# Patient Record
Sex: MALE | Race: WHITE | Employment: FULL TIME | URBAN - METROPOLITAN AREA
[De-identification: names, ages, dates, MRNs, and addresses within clinical notes are randomized per-mention and may not be internally consistent; named-entity substitution may affect disease eponyms.]

---

## 2017-04-20 ENCOUNTER — OFFICE VISIT (OUTPATIENT)
Dept: FAMILY MEDICINE CLINIC | Age: 37
End: 2017-04-20
Payer: COMMERCIAL

## 2017-04-20 VITALS
WEIGHT: 168 LBS | SYSTOLIC BLOOD PRESSURE: 120 MMHG | HEART RATE: 64 BPM | OXYGEN SATURATION: 98 % | BODY MASS INDEX: 22.26 KG/M2 | HEIGHT: 73 IN | DIASTOLIC BLOOD PRESSURE: 78 MMHG | TEMPERATURE: 98.7 F

## 2017-04-20 DIAGNOSIS — H01.001 BLEPHARITIS OF RIGHT UPPER EYELID, UNSPECIFIED TYPE: Primary | ICD-10-CM

## 2017-04-20 DIAGNOSIS — Z23 NEED FOR PNEUMOCOCCAL VACCINATION: ICD-10-CM

## 2017-04-20 PROCEDURE — 90732 PPSV23 VACC 2 YRS+ SUBQ/IM: CPT | Performed by: NURSE PRACTITIONER

## 2017-04-20 PROCEDURE — 90471 IMMUNIZATION ADMIN: CPT | Performed by: NURSE PRACTITIONER

## 2017-04-20 PROCEDURE — 99213 OFFICE O/P EST LOW 20 MIN: CPT | Performed by: NURSE PRACTITIONER

## 2017-04-20 RX ORDER — ERYTHROMYCIN 5 MG/G
OINTMENT OPHTHALMIC
Qty: 1 G | Refills: 0 | Status: SHIPPED | OUTPATIENT
Start: 2017-04-20 | End: 2017-04-30

## 2017-04-20 ASSESSMENT — ENCOUNTER SYMPTOMS
EYE REDNESS: 1
BLURRED VISION: 1
DOUBLE VISION: 0
PHOTOPHOBIA: 0
FOREIGN BODY SENSATION: 1
EYE DISCHARGE: 1
VOMITING: 0
NAUSEA: 0

## 2018-03-27 ENCOUNTER — OFFICE VISIT (OUTPATIENT)
Dept: FAMILY MEDICINE CLINIC | Age: 38
End: 2018-03-27
Payer: COMMERCIAL

## 2018-03-27 VITALS
HEART RATE: 66 BPM | BODY MASS INDEX: 21.34 KG/M2 | DIASTOLIC BLOOD PRESSURE: 70 MMHG | SYSTOLIC BLOOD PRESSURE: 124 MMHG | TEMPERATURE: 98.1 F | WEIGHT: 161 LBS | HEIGHT: 73 IN | OXYGEN SATURATION: 98 %

## 2018-03-27 DIAGNOSIS — Z71.6 ENCOUNTER FOR SMOKING CESSATION COUNSELING: Primary | ICD-10-CM

## 2018-03-27 PROCEDURE — 99407 BEHAV CHNG SMOKING > 10 MIN: CPT | Performed by: NURSE PRACTITIONER

## 2018-03-27 RX ORDER — VARENICLINE TARTRATE 25 MG
KIT ORAL
Qty: 53 TABLET | Refills: 0 | Status: SHIPPED | OUTPATIENT
Start: 2018-03-27 | End: 2018-04-23 | Stop reason: SDUPTHER

## 2018-03-27 ASSESSMENT — ENCOUNTER SYMPTOMS
COUGH: 0
SHORTNESS OF BREATH: 0
VOMITING: 0
NAUSEA: 0
DIARRHEA: 0

## 2018-03-27 ASSESSMENT — PATIENT HEALTH QUESTIONNAIRE - PHQ9
SUM OF ALL RESPONSES TO PHQ QUESTIONS 1-9: 0
1. LITTLE INTEREST OR PLEASURE IN DOING THINGS: 0
SUM OF ALL RESPONSES TO PHQ9 QUESTIONS 1 & 2: 0
2. FEELING DOWN, DEPRESSED OR HOPELESS: 0

## 2018-03-27 NOTE — PROGRESS NOTES
does not drink alcohol. Drug Use:  reports that he does not use drugs. Family History:     No family history on file. Review of Systems:         Review of Systems   Constitutional: Negative for chills and fever. Respiratory: Negative for cough and shortness of breath. Cardiovascular: Negative for chest pain and palpitations. Gastrointestinal: Negative for diarrhea, nausea and vomiting. Physical Exam:     Vitals:  /70   Pulse 66   Temp 98.1 °F (36.7 °C) (Oral)   Ht 6' 1\" (1.854 m)   Wt 161 lb (73 kg)   SpO2 98%   BMI 21.24 kg/m²       Physical Exam   Constitutional: Vital signs are normal. He appears well-developed and well-nourished. He is cooperative. He does not appear ill. No distress. Cardiovascular: Normal rate, regular rhythm, S1 normal and S2 normal.    Pulmonary/Chest: Effort normal. No respiratory distress. He has decreased breath sounds in the right lower field and the left lower field. Skin: Skin is warm, dry and intact. Nursing note and vitals reviewed. Data:     No results found for: NA, K, CL, CO2, BUN, CREATININE, GLUCOSE, PROT, LABALBU, BILITOT, ALKPHOS, AST, ALT  No results found for: WBC, RBC, HGB, HCT, MCV, MCH, MCHC, RDW, PLT, MPV  No results found for: TSH  No results found for: CHOL, HDL, PSA, LABA1C       Assessment & Plan       1. Encounter for smoking cessation counseling       --Patient is wanting to stop smoking. Patient extensively counseled about use of Chantix. Patient educated that if he has any suicidal thoughts or ideation he is to immediately stop taking Chantix and call the office. Disease process of nicotine addiction explained to patient. Patient counseled that he needs to change his daily habits in order to decrease the risk of restarting smoking. Patient educated that every day that he does not smoke is a chance for his lungs to began to heal    Patient verbalizes understanding and agreement with plan.   All questions

## 2018-03-27 NOTE — PATIENT INSTRUCTIONS
SURVEY:    You may be receiving a survey from [employer] regarding your visit today. Please complete the survey to enable us to provide the highest quality of care to you and your family. If you cannot score us a very good on any question, please call the office to discuss how we could have made your experience a very good one. Thank you.

## 2018-03-29 ENCOUNTER — TELEPHONE (OUTPATIENT)
Dept: FAMILY MEDICINE CLINIC | Age: 38
End: 2018-03-29

## 2018-04-23 RX ORDER — VARENICLINE TARTRATE 25 MG
KIT ORAL
Qty: 53 TABLET | Refills: 0 | Status: SHIPPED | OUTPATIENT
Start: 2018-04-23 | End: 2018-05-21 | Stop reason: ALTCHOICE

## 2018-05-21 ENCOUNTER — OFFICE VISIT (OUTPATIENT)
Dept: FAMILY MEDICINE CLINIC | Age: 38
End: 2018-05-21
Payer: COMMERCIAL

## 2018-05-21 VITALS
HEART RATE: 54 BPM | DIASTOLIC BLOOD PRESSURE: 78 MMHG | BODY MASS INDEX: 21.4 KG/M2 | WEIGHT: 158 LBS | HEIGHT: 72 IN | SYSTOLIC BLOOD PRESSURE: 120 MMHG | OXYGEN SATURATION: 99 %

## 2018-05-21 DIAGNOSIS — H00.022 HORDEOLUM INTERNUM OF RIGHT LOWER EYELID: Primary | ICD-10-CM

## 2018-05-21 DIAGNOSIS — Z71.6 ENCOUNTER FOR SMOKING CESSATION COUNSELING: ICD-10-CM

## 2018-05-21 PROCEDURE — 99213 OFFICE O/P EST LOW 20 MIN: CPT | Performed by: NURSE PRACTITIONER

## 2018-05-21 RX ORDER — SULFAMETHOXAZOLE AND TRIMETHOPRIM 800; 160 MG/1; MG/1
1 TABLET ORAL 2 TIMES DAILY
Qty: 14 TABLET | Refills: 0 | Status: SHIPPED | OUTPATIENT
Start: 2018-05-21 | End: 2018-05-28

## 2018-05-21 RX ORDER — VARENICLINE TARTRATE 1 MG/1
1 TABLET, FILM COATED ORAL 2 TIMES DAILY
Qty: 60 TABLET | Refills: 0 | Status: SHIPPED | OUTPATIENT
Start: 2018-05-21 | End: 2018-06-18 | Stop reason: SDUPTHER

## 2018-05-21 ASSESSMENT — ENCOUNTER SYMPTOMS
EYE DISCHARGE: 0
DIARRHEA: 0
VOMITING: 0
DOUBLE VISION: 0
NAUSEA: 0
EYE REDNESS: 1
EYE PAIN: 1
BLURRED VISION: 0
SHORTNESS OF BREATH: 0
COUGH: 0

## 2018-06-18 ENCOUNTER — OFFICE VISIT (OUTPATIENT)
Dept: FAMILY MEDICINE CLINIC | Age: 38
End: 2018-06-18
Payer: COMMERCIAL

## 2018-06-18 VITALS
OXYGEN SATURATION: 98 % | SYSTOLIC BLOOD PRESSURE: 102 MMHG | DIASTOLIC BLOOD PRESSURE: 60 MMHG | HEART RATE: 64 BPM | WEIGHT: 161 LBS | BODY MASS INDEX: 21.84 KG/M2 | TEMPERATURE: 98.2 F

## 2018-06-18 DIAGNOSIS — Z71.6 ENCOUNTER FOR SMOKING CESSATION COUNSELING: Primary | ICD-10-CM

## 2018-06-18 PROCEDURE — 99407 BEHAV CHNG SMOKING > 10 MIN: CPT | Performed by: NURSE PRACTITIONER

## 2018-06-18 RX ORDER — VARENICLINE TARTRATE 1 MG/1
1 TABLET, FILM COATED ORAL 2 TIMES DAILY
Qty: 60 TABLET | Refills: 0 | Status: SHIPPED | OUTPATIENT
Start: 2018-06-18 | End: 2018-10-25 | Stop reason: ALTCHOICE

## 2018-06-18 ASSESSMENT — ENCOUNTER SYMPTOMS
COUGH: 0
NAUSEA: 0
DIARRHEA: 0
VOMITING: 0
SHORTNESS OF BREATH: 0

## 2018-10-25 ENCOUNTER — OFFICE VISIT (OUTPATIENT)
Dept: FAMILY MEDICINE CLINIC | Age: 38
End: 2018-10-25
Payer: COMMERCIAL

## 2018-10-25 VITALS
WEIGHT: 170 LBS | DIASTOLIC BLOOD PRESSURE: 80 MMHG | BODY MASS INDEX: 23.06 KG/M2 | OXYGEN SATURATION: 98 % | HEART RATE: 68 BPM | TEMPERATURE: 97.7 F | SYSTOLIC BLOOD PRESSURE: 120 MMHG

## 2018-10-25 DIAGNOSIS — F51.01 PRIMARY INSOMNIA: Primary | ICD-10-CM

## 2018-10-25 PROCEDURE — 99213 OFFICE O/P EST LOW 20 MIN: CPT | Performed by: NURSE PRACTITIONER

## 2018-10-25 RX ORDER — TRAZODONE HYDROCHLORIDE 50 MG/1
50 TABLET ORAL NIGHTLY
Qty: 30 TABLET | Refills: 0 | Status: SHIPPED | OUTPATIENT
Start: 2018-10-25 | End: 2019-03-21 | Stop reason: SDUPTHER

## 2018-10-25 ASSESSMENT — ENCOUNTER SYMPTOMS
VOMITING: 0
DIARRHEA: 0
SHORTNESS OF BREATH: 0
COUGH: 0
NAUSEA: 0

## 2018-10-25 NOTE — PATIENT INSTRUCTIONS
does sleeping well mean? Sleeping well means getting enough sleep. How much sleep is enough varies among people. The number of hours you sleep is not as important as how you feel when you wake up. If you do not feel refreshed, you probably need more sleep. Another sign of not getting enough sleep is feeling tired during the day. The average total nightly sleep time is 7½ to 8 hours. Healthy adults may need a little more or a little less than this. Why is getting enough sleep important? Getting enough quality sleep is a basic part of good health. When your sleep suffers, your mood and your thoughts can suffer too. You may find yourself feeling more grumpy or stressed. Not getting enough sleep also can lead to serious problems, including injury, accidents, anxiety, and depression. What might cause poor sleeping? Many things can cause sleep problems, including:  · Stress. Stress can be caused by fear about a single event, such as giving a speech. Or you may have ongoing stress, such as worry about work or school. · Depression, anxiety, and other mental or emotional conditions. · Changes in your sleep habits or surroundings. This includes changes that happen where you sleep, such as noise, light, or sleeping in a different bed. It also includes changes in your sleep pattern, such as having jet lag or working a late shift. · Health problems, such as pain, breathing problems, and restless legs syndrome. · Lack of regular exercise. How can you help yourself? Here are some tips that may help you sleep more soundly and wake up feeling more refreshed. Your sleeping area  · Use your bedroom only for sleeping and sex. A bit of light reading may help you fall asleep. But if it doesn't, do your reading elsewhere in the house. Don't watch TV in bed. · Be sure your bed is big enough to stretch out comfortably, especially if you have a sleep partner. · Keep your bedroom quiet, dark, and cool.  Use curtains, blinds,

## 2018-10-25 NOTE — PROGRESS NOTES
Cardiovascular: Negative for chest pain and palpitations. Gastrointestinal: Negative for diarrhea, nausea and vomiting. Physical Exam:     Vitals:  /80   Pulse 68   Temp 97.7 °F (36.5 °C) (Oral)   Wt 170 lb (77.1 kg)   SpO2 98%   BMI 23.06 kg/m²       Physical Exam   Constitutional: Vital signs are normal. He appears well-developed and well-nourished. He is cooperative. Cardiovascular: Normal rate, regular rhythm, S1 normal and S2 normal.    Pulmonary/Chest: Effort normal and breath sounds normal. No respiratory distress. Skin: Skin is warm, dry and intact. Nursing note and vitals reviewed. Data:     No results found for: NA, K, CL, CO2, BUN, CREATININE, GLUCOSE, PROT, LABALBU, BILITOT, ALKPHOS, AST, ALT  No results found for: WBC, RBC, HGB, HCT, MCV, MCH, MCHC, RDW, PLT, MPV  No results found for: TSH  No results found for: CHOL, HDL, PSA, LABA1C       Assessment & Plan        Diagnosis Orders   1. Primary insomnia       Patient educated about sleep hygiene and bedtime routine. Will start patient on trazodone 50 mg by mouth nightly. Patient educated about medication. Patient educated to decrease or stop caffeine 4-6 hours prior to bedtime. Patient verbalizes understanding and agreement with plan. All questions answered. If symptoms do not resolve or worsen, return to office. Completed Refills   Requested Prescriptions     Signed Prescriptions Disp Refills    traZODone (DESYREL) 50 MG tablet 30 tablet 0     Sig: Take 1 tablet by mouth nightly     No Follow-up on file. Orders Placed This Encounter   Medications    traZODone (DESYREL) 50 MG tablet     Sig: Take 1 tablet by mouth nightly     Dispense:  30 tablet     Refill:  0     No orders of the defined types were placed in this encounter. Patient Instructions       SURVEY:    You may be receiving a survey from [employer] regarding your visit today.     Please complete the survey to enable us to during the day. · Keep your bedroom quiet, dark, and cool. · Sleep on a comfortable pillow and mattress. · If watching the clock makes you anxious, turn it facing away from you so you cannot see the time. · If you worry when you lie down, start a worry book. Well before bedtime, write down your worries, and then set the book and your concerns aside. · Try meditation or other relaxation techniques before you go to bed. · If you cannot fall asleep, get up and go to another room until you feel sleepy. Do something relaxing. Repeat your bedtime routine before you go to bed again. · Make your house quiet and calm about an hour before bedtime. Turn down the lights, turn off the TV, log off the computer, and turn down the volume on music. This can help you relax after a busy day. When should you call for help? Watch closely for changes in your health, and be sure to contact your doctor if:    · Your efforts to improve your sleep do not work.     · Your insomnia gets worse.     · You have been feeling down, depressed, or hopeless or have lost interest in things that you usually enjoy. Where can you learn more? Go to [email or web address] org and sign in to your [employer] account. Enter P513 in the MediaPlatform box to learn more about \"Insomnia: Care Instructions. \"     If you do not have an account, please click on the \"Sign Up Now\" link. Current as of: October 10, 2017  Content Version: 11.7  © 1123-5717 [employer]. Care instructions adapted under license by [facility] ([facility]). If you have questions about a medical condition or this instruction, always ask your healthcare professional. [place] [address] any warranty or liability for your use of this information. Patient Education        Learning About Sleeping Well  What does sleeping well mean? Sleeping well means getting enough sleep. How much sleep is enough varies among people.   The number of hours

## 2018-11-28 ENCOUNTER — OFFICE VISIT (OUTPATIENT)
Dept: FAMILY MEDICINE CLINIC | Age: 38
End: 2018-11-28
Payer: COMMERCIAL

## 2018-11-28 VITALS
SYSTOLIC BLOOD PRESSURE: 126 MMHG | WEIGHT: 184 LBS | TEMPERATURE: 98.2 F | DIASTOLIC BLOOD PRESSURE: 70 MMHG | OXYGEN SATURATION: 98 % | BODY MASS INDEX: 24.95 KG/M2 | HEART RATE: 56 BPM

## 2018-11-28 DIAGNOSIS — G47.00 INSOMNIA, UNSPECIFIED TYPE: Primary | ICD-10-CM

## 2018-11-28 PROCEDURE — 99213 OFFICE O/P EST LOW 20 MIN: CPT | Performed by: NURSE PRACTITIONER

## 2018-11-28 ASSESSMENT — ENCOUNTER SYMPTOMS
DIARRHEA: 0
SHORTNESS OF BREATH: 0
COUGH: 0
NAUSEA: 0
VOMITING: 0

## 2019-03-21 ENCOUNTER — OFFICE VISIT (OUTPATIENT)
Dept: FAMILY MEDICINE CLINIC | Age: 39
End: 2019-03-21
Payer: COMMERCIAL

## 2019-03-21 VITALS
WEIGHT: 185 LBS | TEMPERATURE: 98.5 F | DIASTOLIC BLOOD PRESSURE: 80 MMHG | SYSTOLIC BLOOD PRESSURE: 124 MMHG | HEART RATE: 70 BPM | OXYGEN SATURATION: 98 % | BODY MASS INDEX: 25.09 KG/M2

## 2019-03-21 DIAGNOSIS — F41.1 GAD (GENERALIZED ANXIETY DISORDER): ICD-10-CM

## 2019-03-21 DIAGNOSIS — F51.01 PRIMARY INSOMNIA: Primary | ICD-10-CM

## 2019-03-21 PROCEDURE — G0444 DEPRESSION SCREEN ANNUAL: HCPCS | Performed by: NURSE PRACTITIONER

## 2019-03-21 PROCEDURE — 99213 OFFICE O/P EST LOW 20 MIN: CPT | Performed by: NURSE PRACTITIONER

## 2019-03-21 RX ORDER — TRAZODONE HYDROCHLORIDE 50 MG/1
50 TABLET ORAL NIGHTLY
Qty: 30 TABLET | Refills: 2 | Status: SHIPPED | OUTPATIENT
Start: 2019-03-21 | End: 2020-02-13 | Stop reason: SDUPTHER

## 2019-03-21 RX ORDER — BUSPIRONE HYDROCHLORIDE 10 MG/1
10 TABLET ORAL 2 TIMES DAILY
Qty: 60 TABLET | Refills: 2 | Status: SHIPPED | OUTPATIENT
Start: 2019-03-21 | End: 2020-05-05 | Stop reason: SDUPTHER

## 2019-03-21 ASSESSMENT — PATIENT HEALTH QUESTIONNAIRE - PHQ9
SUM OF ALL RESPONSES TO PHQ QUESTIONS 1-9: 11
10. IF YOU CHECKED OFF ANY PROBLEMS, HOW DIFFICULT HAVE THESE PROBLEMS MADE IT FOR YOU TO DO YOUR WORK, TAKE CARE OF THINGS AT HOME, OR GET ALONG WITH OTHER PEOPLE: 1
SUM OF ALL RESPONSES TO PHQ QUESTIONS 1-9: 11
6. FEELING BAD ABOUT YOURSELF - OR THAT YOU ARE A FAILURE OR HAVE LET YOURSELF OR YOUR FAMILY DOWN: 1
1. LITTLE INTEREST OR PLEASURE IN DOING THINGS: 3
2. FEELING DOWN, DEPRESSED OR HOPELESS: 1
8. MOVING OR SPEAKING SO SLOWLY THAT OTHER PEOPLE COULD HAVE NOTICED. OR THE OPPOSITE, BEING SO FIGETY OR RESTLESS THAT YOU HAVE BEEN MOVING AROUND A LOT MORE THAN USUAL: 0
4. FEELING TIRED OR HAVING LITTLE ENERGY: 1
3. TROUBLE FALLING OR STAYING ASLEEP: 3
7. TROUBLE CONCENTRATING ON THINGS, SUCH AS READING THE NEWSPAPER OR WATCHING TELEVISION: 1
5. POOR APPETITE OR OVEREATING: 1
SUM OF ALL RESPONSES TO PHQ9 QUESTIONS 1 & 2: 4
9. THOUGHTS THAT YOU WOULD BE BETTER OFF DEAD, OR OF HURTING YOURSELF: 0

## 2019-03-21 ASSESSMENT — ENCOUNTER SYMPTOMS
DIARRHEA: 0
COUGH: 0
SHORTNESS OF BREATH: 0
NAUSEA: 0
VOMITING: 0

## 2019-04-22 ENCOUNTER — OFFICE VISIT (OUTPATIENT)
Dept: FAMILY MEDICINE CLINIC | Age: 39
End: 2019-04-22
Payer: COMMERCIAL

## 2019-04-22 VITALS
WEIGHT: 184 LBS | OXYGEN SATURATION: 98 % | BODY MASS INDEX: 24.95 KG/M2 | HEART RATE: 68 BPM | DIASTOLIC BLOOD PRESSURE: 82 MMHG | TEMPERATURE: 98.3 F | SYSTOLIC BLOOD PRESSURE: 130 MMHG

## 2019-04-22 DIAGNOSIS — F41.1 GAD (GENERALIZED ANXIETY DISORDER): ICD-10-CM

## 2019-04-22 DIAGNOSIS — F51.01 PRIMARY INSOMNIA: Primary | ICD-10-CM

## 2019-04-22 PROCEDURE — 99213 OFFICE O/P EST LOW 20 MIN: CPT | Performed by: NURSE PRACTITIONER

## 2019-04-22 ASSESSMENT — ENCOUNTER SYMPTOMS
VOMITING: 0
COUGH: 0
DIARRHEA: 0
NAUSEA: 0
SHORTNESS OF BREATH: 0

## 2019-04-22 NOTE — PATIENT INSTRUCTIONS
SURVEY:    You may be receiving a survey from [employer] regarding your visit today. Please complete the survey to enable us to provide the highest quality of care to you and your family. If you cannot score us a very good on any question, please call the office to discuss how we could have made your experience a very good one. Thank you.

## 2019-04-22 NOTE — PROGRESS NOTES
HPI Notes    Name: Candy Boles  : 1980         Chief Complaint:     Chief Complaint   Patient presents with   [address] Problem     Patient here today for anxiety follow up, he is taking buspar. He did stop medication 3 days ago because he felt like it was messing with his bowels. He said his anxiety is better. History of Present Illness:        Mental Health Problem   The primary symptoms include dysphoric mood. This is a recurrent problem. The onset of the illness is precipitated by emotional stress. The degree of incapacity that he is experiencing as a consequence of his illness is mild. Additional symptoms of the illness do not include insomnia or hypersomnia. He does not admit to suicidal ideas. He does not have a plan to commit suicide. He does not contemplate harming himself. He has not already injured self. He does not contemplate injuring another person. He has not already  injured another person. Trazodone is effective for sleep. Past Medical History:     No past medical history on file. Reviewed all health maintenance requirements and ordered appropriate tests  Health Maintenance Due   Topic Date Due    Varicella Vaccine (1 of 2 - 13+ 2-dose series) 1993    HIV screen  1995    DTaP/Tdap/Td vaccine (1 - Tdap) 1999       Past Surgical History:     No past surgical history on file. Medications:       Prior to Admission medications    Medication Sig Start Date End Date Taking? Authorizing Provider   traZODone (DESYREL) 50 MG tablet Take 1 tablet by mouth nightly 3/21/19  Yes AIRAM Thomson CNP        Allergies:       Penicillins    Social History:     Tobacco:    reports that he quit smoking about 11 months ago. He smoked 1.00 pack per day. He has never used smokeless tobacco.  Alcohol:      reports that he does not drink alcohol. Drug Use:  reports that he does not use drugs. Family History:      No family history on file.     Review of Systems:         Review of Systems   Constitutional: Negative for chills and fever. Respiratory: Negative for cough and shortness of breath. Cardiovascular: Negative for chest pain and palpitations. Gastrointestinal: Negative for diarrhea, nausea and vomiting. Psychiatric/Behavioral: Positive for dysphoric mood. Negative for behavioral problems, confusion and sleep disturbance. The patient is not nervous/anxious and does not have insomnia. Physical Exam:     Vitals:  /82   Pulse 68   Temp 98.3 °F (36.8 °C) (Oral)   Wt 184 lb (83.5 kg)   SpO2 98%   BMI 24.95 kg/m²       Physical Exam   Constitutional: Vital signs are normal. He appears well-developed and well-nourished. He is cooperative. Cardiovascular: Normal rate, regular rhythm, S1 normal and S2 normal.   Pulmonary/Chest: Effort normal and breath sounds normal. No respiratory distress. Skin: Skin is warm, dry and intact. Psychiatric: He has a normal mood and affect. His speech is normal and behavior is normal. Judgment normal. Cognition and memory are normal. He expresses no homicidal and no suicidal ideation. He expresses no suicidal plans and no homicidal plans. Nursing note and vitals reviewed. Data:     No results found for: NA, K, CL, CO2, BUN, CREATININE, GLUCOSE, PROT, LABALBU, BILITOT, ALKPHOS, AST, ALT  No results found for: WBC, RBC, HGB, HCT, MCV, MCH, MCHC, RDW, PLT, MPV  No results found for: TSH  No results found for: CHOL, HDL, PSA, LABA1C       Assessment & Plan        Diagnosis Orders   1. Primary insomnia  --trazodone is effective. Doesn't sleep well without it. Wants to continue taking. 2. SASCHA (generalized anxiety disorder)  --stress related to person at work has changed since that person was moved. Problems at home better. Pt has not taken buspar in several days and feels good. Wants to try not taking anything. Patient verbalizes understanding and agreement with plan.   All questions answered. If symptoms do not resolve or worsen, return to office. Completed Refills   Requested Prescriptions      No prescriptions requested or ordered in this encounter     No follow-ups on file. No orders of the defined types were placed in this encounter. No orders of the defined types were placed in this encounter. Patient Instructions     SURVEY:    You may be receiving a survey from [employer] regarding your visit today. Please complete the survey to enable us to provide the highest quality of care to you and your family. If you cannot score us a very good on any question, please call the office to discuss how we could have made your experience a very good one. Thank you. Electronically signed by AIRAM Thomson CNP on 4/22/2019 at 2:45 PM           Completed Refills      Requested Prescriptions      No prescriptions requested or ordered in this encounter         Pj Montero received counseling on the following healthy behaviors: nutrition, exercise and medication adherence  Reviewed prior labs and health maintenance. Continue current medications, diet and exercise. Discussed use, benefit, and side effects of prescribed medications. Barriers to medication compliance addressed. Patient given educational materials - see patient instructions. All patient questions answered. Patient voiced understanding.

## 2020-02-13 ENCOUNTER — OFFICE VISIT (OUTPATIENT)
Dept: FAMILY MEDICINE CLINIC | Age: 40
End: 2020-02-13
Payer: COMMERCIAL

## 2020-02-13 ENCOUNTER — HOSPITAL ENCOUNTER (OUTPATIENT)
Age: 40
Discharge: HOME | End: 2020-02-13
Payer: COMMERCIAL

## 2020-02-13 VITALS
WEIGHT: 193 LBS | DIASTOLIC BLOOD PRESSURE: 80 MMHG | BODY MASS INDEX: 26.14 KG/M2 | SYSTOLIC BLOOD PRESSURE: 130 MMHG | HEART RATE: 74 BPM | OXYGEN SATURATION: 98 % | HEIGHT: 72 IN

## 2020-02-13 LAB
ABSOLUTE EOS #: 0.1 K/UL (ref 0–0.4)
ABSOLUTE IMMATURE GRANULOCYTE: NORMAL K/UL (ref 0–0.3)
ABSOLUTE LYMPH #: 2 K/UL (ref 1–4.8)
ABSOLUTE MONO #: 0.5 K/UL (ref 0–1)
ALBUMIN SERPL-MCNC: 5.1 G/DL (ref 3.5–5.2)
ALBUMIN/GLOBULIN RATIO: ABNORMAL (ref 1–2.5)
ALP BLD-CCNC: 86 U/L (ref 40–129)
ALT SERPL-CCNC: 28 U/L (ref 5–41)
ANION GAP SERPL CALCULATED.3IONS-SCNC: 12 MMOL/L (ref 9–17)
AST SERPL-CCNC: 25 U/L
BASOPHILS # BLD: 1 % (ref 0–2)
BASOPHILS ABSOLUTE: 0 K/UL (ref 0–0.2)
BILIRUB SERPL-MCNC: 0.37 MG/DL (ref 0.3–1.2)
BUN BLDV-MCNC: 14 MG/DL (ref 6–20)
BUN/CREAT BLD: 13 (ref 9–20)
CALCIUM SERPL-MCNC: 10.2 MG/DL (ref 8.6–10.4)
CHLORIDE BLD-SCNC: 100 MMOL/L (ref 98–107)
CO2: 26 MMOL/L (ref 20–31)
CREAT SERPL-MCNC: 1.1 MG/DL (ref 0.7–1.2)
DIFFERENTIAL TYPE: YES
EOSINOPHILS RELATIVE PERCENT: 1 % (ref 0–5)
GFR AFRICAN AMERICAN: >60 ML/MIN
GFR NON-AFRICAN AMERICAN: >60 ML/MIN
GFR SERPL CREATININE-BSD FRML MDRD: ABNORMAL ML/MIN/{1.73_M2}
GFR SERPL CREATININE-BSD FRML MDRD: ABNORMAL ML/MIN/{1.73_M2}
GLUCOSE BLD-MCNC: 106 MG/DL (ref 70–99)
HCT VFR BLD CALC: 43.5 % (ref 41–53)
HEMOGLOBIN: 14.6 G/DL (ref 13.5–17.5)
IMMATURE GRANULOCYTES: NORMAL %
LYMPHOCYTES # BLD: 27 % (ref 13–44)
MCH RBC QN AUTO: 30.3 PG (ref 26–34)
MCHC RBC AUTO-ENTMCNC: 33.7 G/DL (ref 31–37)
MCV RBC AUTO: 89.9 FL (ref 80–100)
MONOCYTES # BLD: 7 % (ref 5–9)
MONONUCLEOSIS SCREEN: NEGATIVE
NRBC AUTOMATED: NORMAL PER 100 WBC
PDW BLD-RTO: 14 % (ref 12.1–15.2)
PLATELET # BLD: 277 K/UL (ref 140–450)
PLATELET ESTIMATE: NORMAL
PMV BLD AUTO: NORMAL FL (ref 6–12)
POTASSIUM SERPL-SCNC: 4.6 MMOL/L (ref 3.7–5.3)
RBC # BLD: 4.84 M/UL (ref 4.5–5.9)
RBC # BLD: NORMAL 10*6/UL
SEG NEUTROPHILS: 64 % (ref 39–75)
SEGMENTED NEUTROPHILS ABSOLUTE COUNT: 4.8 K/UL (ref 2.1–6.5)
SODIUM BLD-SCNC: 138 MMOL/L (ref 135–144)
TOTAL PROTEIN: 8 G/DL (ref 6.4–8.3)
WBC # BLD: 7.4 K/UL (ref 3.5–11)
WBC # BLD: NORMAL 10*3/UL

## 2020-02-13 PROCEDURE — 99213 OFFICE O/P EST LOW 20 MIN: CPT | Performed by: FAMILY MEDICINE

## 2020-02-13 PROCEDURE — 80053 COMPREHEN METABOLIC PANEL: CPT

## 2020-02-13 PROCEDURE — 36415 COLL VENOUS BLD VENIPUNCTURE: CPT

## 2020-02-13 PROCEDURE — 85025 COMPLETE CBC W/AUTO DIFF WBC: CPT

## 2020-02-13 PROCEDURE — 86308 HETEROPHILE ANTIBODY SCREEN: CPT

## 2020-02-13 RX ORDER — TRAZODONE HYDROCHLORIDE 50 MG/1
50 TABLET ORAL NIGHTLY
Qty: 30 TABLET | Refills: 2 | Status: SHIPPED | OUTPATIENT
Start: 2020-02-13 | End: 2020-05-05 | Stop reason: SDUPTHER

## 2020-02-13 RX ORDER — CEPHALEXIN 500 MG/1
500 CAPSULE ORAL 3 TIMES DAILY
Qty: 21 CAPSULE | Refills: 0 | Status: SHIPPED | OUTPATIENT
Start: 2020-02-13 | End: 2020-05-05 | Stop reason: ALTCHOICE

## 2020-02-13 SDOH — ECONOMIC STABILITY: INCOME INSECURITY: HOW HARD IS IT FOR YOU TO PAY FOR THE VERY BASICS LIKE FOOD, HOUSING, MEDICAL CARE, AND HEATING?: NOT HARD AT ALL

## 2020-02-13 SDOH — ECONOMIC STABILITY: FOOD INSECURITY: WITHIN THE PAST 12 MONTHS, YOU WORRIED THAT YOUR FOOD WOULD RUN OUT BEFORE YOU GOT MONEY TO BUY MORE.: NEVER TRUE

## 2020-02-13 SDOH — ECONOMIC STABILITY: FOOD INSECURITY: WITHIN THE PAST 12 MONTHS, THE FOOD YOU BOUGHT JUST DIDN'T LAST AND YOU DIDN'T HAVE MONEY TO GET MORE.: NEVER TRUE

## 2020-02-13 ASSESSMENT — PATIENT HEALTH QUESTIONNAIRE - PHQ9
SUM OF ALL RESPONSES TO PHQ9 QUESTIONS 1 & 2: 0
SUM OF ALL RESPONSES TO PHQ QUESTIONS 1-9: 0
2. FEELING DOWN, DEPRESSED OR HOPELESS: 0
SUM OF ALL RESPONSES TO PHQ QUESTIONS 1-9: 0
1. LITTLE INTEREST OR PLEASURE IN DOING THINGS: 0

## 2020-02-13 ASSESSMENT — ENCOUNTER SYMPTOMS: RESPIRATORY NEGATIVE: 1

## 2020-02-13 NOTE — PROGRESS NOTES
Name: Ismael Reinoso  : 1980         Chief Complaint:     Chief Complaint   Patient presents with    Swelling     swelling on the right side of neck, hurts to touch, started Monday. denies fever or other symptoms.  Insomnia     refill trazodone       History of Present Illness:      Ismael Reinoso is a 44 y.o.  male who presents with Swelling (swelling on the right side of neck, hurts to touch, started Monday. denies fever or other symptoms. ) and Insomnia (refill trazodone)      HPI     Onset of swelling and pain in L anterior neck 2 days ago and has worsened. Hurts all the time but even more with swallowing, talking, and touching the area. No sore throat or other URI symptoms. In process of planning some dental work but no acute trouble with teeth. The pain has been kind of diffuse into jaws and head. No fever or chills. No tx tried. Insomnia helped by trazodone, uses 1/2 tab prn. Some uncertainty at work which has been keeping pt up at night lately. Past Medical History:     History reviewed. No pertinent past medical history. Past Surgical History:     No past surgical history on file. Medications:       Prior to Admission medications    Medication Sig Start Date End Date Taking? Authorizing Provider   traZODone (DESYREL) 50 MG tablet Take 1 tablet by mouth nightly 20  Yes Tisha Diana DO   cephALEXin (KEFLEX) 500 MG capsule Take 1 capsule by mouth 3 times daily 20  Yes Tisha Daina DO        Allergies:       Penicillins    Social History:     Tobacco:    reports that he quit smoking about 21 months ago. He smoked 1.00 pack per day. He has never used smokeless tobacco.  Alcohol:      reports no history of alcohol use. Drug Use:  reports no history of drug use. Family History:     No family history on file. Review of Systems:     Positive and Negative as described in HPI    Review of Systems   Constitutional: Negative. HENT: Negative. Respiratory: Negative. Physical Exam:     Vitals:  /80   Pulse 74   Ht 6' (1.829 m)   Wt 193 lb (87.5 kg)   SpO2 98%   BMI 26.18 kg/m²   Physical Exam  Vitals signs and nursing note reviewed. Constitutional:       General: He is not in acute distress. Appearance: He is well-developed. HENT:      Right Ear: Tympanic membrane and ear canal normal.      Left Ear: Tympanic membrane and ear canal normal.      Nose: Nose normal.   Eyes:      Conjunctiva/sclera: Conjunctivae normal.   Neck:      Musculoskeletal: Neck supple. Cardiovascular:      Rate and Rhythm: Normal rate and regular rhythm. Heart sounds: Normal heart sounds. Pulmonary:      Effort: Pulmonary effort is normal.      Breath sounds: Normal breath sounds. Lymphadenopathy:      Head:      Left side of head: Posterior auricular adenopathy present. Cervical: Cervical adenopathy present. Right cervical: Superficial cervical adenopathy present. Left cervical: Superficial cervical adenopathy present. Upper Body:      Right upper body: No supraclavicular, axillary or epitrochlear adenopathy. Left upper body: No supraclavicular, axillary or epitrochlear adenopathy. Comments: cristopher anterior cervical chain: fusiform mild swelling of nodes just lateral to larynx, larger on L and very tender on L. No overlying cellulitis. Skin:     General: Skin is warm and dry. Neurological:      Mental Status: He is alert and oriented to person, place, and time.    Psychiatric:         Judgment: Judgment normal.         Data:     Lab Results   Component Value Date     02/13/2020    K 4.6 02/13/2020     02/13/2020    CO2 26 02/13/2020    BUN 14 02/13/2020    CREATININE 1.10 02/13/2020    GLUCOSE 106 02/13/2020    PROT 8.0 02/13/2020    LABALBU 5.1 02/13/2020    BILITOT 0.37 02/13/2020    ALKPHOS 86 02/13/2020    AST 25 02/13/2020    ALT 28 02/13/2020     Lab Results   Component Value Date    WBC 7.4 02/13/2020    RBC 4.84 02/13/2020    HGB 14.6 02/13/2020    HCT 43.5 02/13/2020    MCV 89.9 02/13/2020    MCH 30.3 02/13/2020    MCHC 33.7 02/13/2020    RDW 14.0 02/13/2020     02/13/2020    MPV NOT REPORTED 02/13/2020     No results found for: TSH  No results found for: CHOL, HDL, PSA, LABA1C      Assessment & Plan:        Diagnosis Orders   1. Lymphadenitis  CBC Auto Differential    Comprehensive Metabolic Panel    Mononucleosis Screen   2. Primary insomnia  traZODone (DESYREL) 50 MG tablet   L anterior cervical lymphadenitis with normal oral exam and no recent URI that he remembers. Likely reactive in nature but also checking labs for completeness, elsie with presence of L posterior auricular node also, which pt indicated has fluctuated in size for a long time. atb prescribed. Cont trazodone prn for insomnia. Requested Prescriptions     Signed Prescriptions Disp Refills    traZODone (DESYREL) 50 MG tablet 30 tablet 2     Sig: Take 1 tablet by mouth nightly    cephALEXin (KEFLEX) 500 MG capsule 21 capsule 0     Sig: Take 1 capsule by mouth 3 times daily       Patient Instructions   SURVEY:    You may be receiving a survey from [employer] regarding your visit today. You may get this in the mail, through your MyChart or in your email. Please complete the survey to enable us to provide the highest quality of care to you and your family. If you cannot score us as very good ( 5 Stars) on any question, please feel free to call the office to discuss how we could have made your experience exceptional.     Thank you. Clinical Care Team:  DO Canelo Pillai, [address] [place] [address] Team:  Jose Armando Haywood received counseling on the following healthy behaviors: medication adherence  Reviewed prior labs and health maintenance.   Continue current medications, diet and exercise. Discussed use, benefit, and side effects of prescribed medications. Barriers to medication compliance addressed. Patient given educational materials - see patient instructions. All patient questions answered. Patient voiced understanding.      Electronically signed by Paola Valladares DO on 2/13/2020 at 11:54 PM   [address]  Dept: [phone]

## 2020-03-27 ENCOUNTER — TELEPHONE (OUTPATIENT)
Dept: FAMILY MEDICINE CLINIC | Age: 40
End: 2020-03-27

## 2020-03-27 NOTE — TELEPHONE ENCOUNTER
Patient was in on 2/13/20 for lump on the right side of neck. He states he completed his antibiotic and now the lump has returned. He would like to know what to do next? Health Maintenance   Topic Date Due    Varicella vaccine (1 of 2 - 2-dose childhood series) 09/02/1981    HIV screen  09/02/1995    DTaP/Tdap/Td vaccine (1 - Tdap) 09/02/1999    Flu vaccine (1) 09/01/2019    Hepatitis A vaccine  Aged Out    Hepatitis B vaccine  Aged Out    Hib vaccine  Aged Out    Meningococcal (ACWY) vaccine  Aged Out    Pneumococcal 0-64 years Vaccine  Aged Out             (applicable per patient's age: Cancer Screenings, Depression Screening, Fall Risk Screening, Immunizations)    AST (U/L)   Date Value   02/13/2020 25     ALT (U/L)   Date Value   02/13/2020 28     BUN (mg/dL)   Date Value   02/13/2020 14      (goal A1C is < 7)   (goal LDL is <100) need 30-50% reduction from baseline     BP Readings from Last 3 Encounters:   02/13/20 130/80   04/22/19 130/82   03/21/19 124/80    (goal /80)      All Future Testing planned in CarePATH:      Next Visit Date:  No future appointments. There is no problem list on file for this patient.

## 2020-05-05 ENCOUNTER — TELEPHONE (OUTPATIENT)
Dept: FAMILY MEDICINE CLINIC | Age: 40
End: 2020-05-05

## 2020-05-05 RX ORDER — BUSPIRONE HYDROCHLORIDE 10 MG/1
10 TABLET ORAL 2 TIMES DAILY
Qty: 60 TABLET | Refills: 2 | Status: SHIPPED | OUTPATIENT
Start: 2020-05-05 | End: 2020-06-04

## 2020-05-05 RX ORDER — TRAZODONE HYDROCHLORIDE 50 MG/1
50 TABLET ORAL NIGHTLY
Qty: 30 TABLET | Refills: 2 | Status: SHIPPED | OUTPATIENT
Start: 2020-05-05 | End: 2021-02-11 | Stop reason: SDUPTHER

## 2020-05-05 NOTE — LETTER
[facility] [place] [address]  Phone: [phone]  Fax: Alexis Reyna [phone], APRN - CNP        May 5, 2020    Shahid Cunningham  [address] [place] [address]      To whom it may concern,    Please excuse Shahid Cunningham from work on 5/4/20 and 5/5/20 . If you have any questions or concerns, please don't hesitate to call.     Sincerely,        AIRAM Elizondo - CNP

## 2020-05-05 NOTE — TELEPHONE ENCOUNTER
Please have patient continue to take 1 tablet of trazodone nightly. Start BuSpar 10 mg p.o. twice daily.

## 2020-06-19 ENCOUNTER — TELEPHONE (OUTPATIENT)
Dept: FAMILY MEDICINE CLINIC | Age: 40
End: 2020-06-19

## 2020-06-19 NOTE — LETTER
[facility] PATIENCE [facility] [address] [place] [id number]  Phone: [phone]  Fax: Alexis Reyna [phone], APRN - CNP        June 19, 2020     Patient: Wilman Beltran   YOB: 1980   Date of Visit: 6/19/2020       To Whom it May Concern: It is my medical opinion that Wilman Beltran should be excused from wearing a mask at his station. Lamin Boo should continue to wear a mask into the building and until he gets to his station or if he leaves his station. If you have any questions or concerns, please don't hesitate to call.     Sincerely,         Geoffrey Stevenson, AIRAM - CNP

## 2020-08-27 ENCOUNTER — TELEPHONE (OUTPATIENT)
Dept: FAMILY MEDICINE CLINIC | Age: 40
End: 2020-08-27

## 2020-08-27 NOTE — TELEPHONE ENCOUNTER
Patient would like a work slip for the last 2 days for his anxiety & insomnia    Health Maintenance   Topic Date Due    Varicella vaccine (1 of 2 - 2-dose childhood series) 09/02/1981    HIV screen  09/02/1995    DTaP/Tdap/Td vaccine (1 - Tdap) 09/02/1999    Flu vaccine (1) 09/01/2020    Hepatitis A vaccine  Aged Out    Hepatitis B vaccine  Aged Out    Hib vaccine  Aged Out    Meningococcal (ACWY) vaccine  Aged Out    Pneumococcal 0-64 years Vaccine  Aged Out             (applicable per patient's age: Cancer Screenings, Depression Screening, Fall Risk Screening, Immunizations)    AST (U/L)   Date Value   02/13/2020 25     ALT (U/L)   Date Value   02/13/2020 28     BUN (mg/dL)   Date Value   02/13/2020 14      (goal A1C is < 7)   (goal LDL is <100) need 30-50% reduction from baseline     BP Readings from Last 3 Encounters:   02/13/20 130/80   04/22/19 130/82   03/21/19 124/80    (goal /80)      All Future Testing planned in CarePATH:  Lab Frequency Next Occurrence   CT SOFT TISSUE NECK W CONTRAST Once 03/26/2021       Next Visit Date:  No future appointments. There is no problem list on file for this patient.

## 2020-08-27 NOTE — TELEPHONE ENCOUNTER
Patient notified ok for work slip but next time needs an apt if he needs to be off.    Off work note faxed to [employer]

## 2020-11-30 ENCOUNTER — OFFICE VISIT (OUTPATIENT)
Dept: PRIMARY CARE CLINIC | Age: 40
End: 2020-11-30
Payer: COMMERCIAL

## 2020-11-30 ENCOUNTER — HOSPITAL ENCOUNTER (OUTPATIENT)
Age: 40
Setting detail: SPECIMEN
Discharge: HOME | End: 2020-11-30
Payer: COMMERCIAL

## 2020-11-30 VITALS
DIASTOLIC BLOOD PRESSURE: 100 MMHG | RESPIRATION RATE: 16 BRPM | HEIGHT: 73 IN | WEIGHT: 180.1 LBS | HEART RATE: 68 BPM | BODY MASS INDEX: 23.87 KG/M2 | TEMPERATURE: 98.4 F | OXYGEN SATURATION: 100 % | SYSTOLIC BLOOD PRESSURE: 138 MMHG

## 2020-11-30 PROCEDURE — U0003 INFECTIOUS AGENT DETECTION BY NUCLEIC ACID (DNA OR RNA); SEVERE ACUTE RESPIRATORY SYNDROME CORONAVIRUS 2 (SARS-COV-2) (CORONAVIRUS DISEASE [COVID-19]), AMPLIFIED PROBE TECHNIQUE, MAKING USE OF HIGH THROUGHPUT TECHNOLOGIES AS DESCRIBED BY CMS-2020-01-R: HCPCS

## 2020-11-30 PROCEDURE — C9803 HOPD COVID-19 SPEC COLLECT: HCPCS

## 2020-11-30 PROCEDURE — 99213 OFFICE O/P EST LOW 20 MIN: CPT | Performed by: NURSE PRACTITIONER

## 2020-11-30 NOTE — PATIENT INSTRUCTIONS
Patient Education        10 Things to Do When You Have COVID-19    Stay home. Don't go to school, work, or public areas. And don't use public transportation, ride-shares, or taxis unless you have no choice. Leave your home only if you need to get medical care. But call the doctor's office first so they know you're coming. And wear a cloth face cover. Ask before leaving isolation. Talk with your doctor or other health professional about when it will be safe for you to leave isolation. Wear a cloth face cover when you are around other people. It can help stop the spread of the virus when you cough or sneeze. Limit contact with people in your home. If possible, stay in a separate bedroom and use a separate bathroom. Avoid contact with pets and other animals. If possible, have a friend or family member care for them while you're sick. Cover your mouth and nose with a tissue when you cough or sneeze. Then throw the tissue in the trash right away. Wash your hands often, especially after you cough or sneeze. Use soap and water, and scrub for at least 20 seconds. If soap and water aren't available, use an alcohol-based hand . Don't share personal household items. These include bedding, towels, cups and glasses, and eating utensils. Clean and disinfect your home every day. Use household  or disinfectant wipes or sprays. Take special care to clean things that you grab with your hands. These include doorknobs, remote controls, phones, and handles on your refrigerator and microwave. And don't forget countertops, tabletops, bathrooms, and computer keyboards. Take acetaminophen (Tylenol) to relieve fever and body aches. Read and follow all instructions on the label. Current as of: July 10, 2020               Content Version: 12.6  © 2006-2020 [employer]. Care instructions adapted under license by [facility] [place] ([facility]).  If you have questions about a medical trouble breathing. (You can't talk at all.)  · You have constant chest pain or pressure. · You are severely dizzy or lightheaded. · You are confused or can't think clearly. · Your face and lips have a blue color. · You passed out (lost consciousness) or are very hard to wake up. Call your doctor now if you develop symptoms such as:  · Shortness of breath. · Fever. · Cough. If you need to get care, call ahead to the doctor's office for instructions before you go. Make sure you wear a face cover to prevent exposing other people to the virus. Where can you get the latest information? The following health organizations are tracking and studying this virus. Their websites contain the most up-to-date information. Karis Fragoso also learn what to do if you think you may have been exposed to the virus. · U.S. Centers for Disease Control and Prevention (CDC): The CDC provides updated news about the disease and travel advice. The website also tells you how to prevent the spread of infection. www.cdc.gov  · World Health Organization [facility]): WHO offers information about the virus outbreaks. WHO also has travel advice. www.who.int  Current as of: July 10, 2020               Content Version: 12.6  © 2006-2020 [employer]. Care instructions adapted under license by [facility] ([facility]). If you have questions about a medical condition or this instruction, always ask your healthcare professional. [place] [address] any warranty or liability for your use of this information. · Practice meticulous handwashing and cover cough to prevent spread of infection. · Advised to quarantine self at home until receives results from COVID-19 - will call with results. · Do not return to work or school until symptoms have resolved and no fever for 24 hrs without medication.   · Initiated Get Well Loop  · Encouraged to increase fluids and rest  · Tylenol OTC PRN for pain, discomfort or fever as directed on package  · Cool mist humidifier  · Hot tea with honey and lemon for cough PRN  · Patient instructions given for Covid 19 infection. .  · To ER or call 911 if any increasing difficulty breathing, shortness of breath, inability to swallow, hives, rash, facial/tongue swelling or temp greater than 103 degrees. · Follow up with PCP or Flu Clinic as needed if symptoms worsen or do not improve.

## 2020-11-30 NOTE — LETTER
[facility]  [address] [address] [address]  Phone: [phone]  Fax: Alexis Vergara, APRN - CNP        November 30, 2020     Patient: Austin Gomez   YOB: 1980   Date of Visit: 11/30/2020       To Whom it May Concern:    Austin Gomez was seen in my clinic on 11/30/2020. He has been Covid-19 tested, await final results with turnaround time of 3 to 7 days. If you have any questions or concerns, please don't hesitate to call.     Sincerely,           Humza Hilario, APRN - CNP

## 2020-11-30 NOTE — PROGRESS NOTES
Austin Gomez  1980    Chief Complaint   Patient presents with    Fever     Pt presents today with fever highest was 100. chills, fatigue, sob, chest tightness, sx started 3 days ago, possibly exposed to covid at work , loss taste and smell, pt stated thursday night he felt light headed and felt like he was Noraia pass out. Respiratory Symptoms:  Patient complains of 3 day(s) history of fever: low grade, headache, ear congestion: bilaterally, nasal congestion, facial pain/sinus pressure: bilateral maxillary, sneezing, sore throat, shortness of breath, wheezing/chest tightness, non-productive cough, productive cough: Sputum is clear , lightheadedness, neck pain, rash, nausea without vomiting, diarrhea and abdominal pain. Symptoms have been improving with time. He denies any other symptoms . Relevant PMH: No pertinent PMH. Smoking history:  He  reports that he quit smoking about 2 years ago. He smoked 1.00 pack per day. He has never used smokeless tobacco.     He has had ill contacts with possibly with co-workers that positive. Treatment to date: OTC cough suppressant. Travel screen completed:  Yes        HPI:   Started on Thursday night with fever up to 100 degrees, headaches, body aches, ear congestion, sinus pressure, sneezing, sore throat, shortness of breath, chest tightness, cough, neck pain, rash and nausea without vomiting. .  Reports loss of taste and loss of smell that started today. Denies respiratory difficulty. Taking Paulette [place] Cold and Flu, Dayquil and Nyquil which helped some. Known exposure to Covid-19 at [facility]. Wife also has symptoms and has appointment with PCP today.     Vitals:    11/30/20 1334 11/30/20 1343   BP: (!) 167/111 (!) 138/100   Site: Right Upper Arm Left Upper Arm   Position: Sitting Sitting   Cuff Size: Large Adult Medium Adult   Pulse: 68    Resp: 16    Temp: 98.4 °F (36.9 °C)    TempSrc: Oral    SpO2: 100%    Weight: 180 lb 1.6 oz (81.7 kg)    Height: 6' 1\" (1.854 m)       Physical Exam  Vitals signs and nursing note reviewed. Constitutional:       General: He is not in acute distress. Appearance: Normal appearance. He is well-developed. He is not ill-appearing or diaphoretic. Comments: Well hydrated, nontoxic appearance. HENT:      Head: Normocephalic and atraumatic. Right Ear: Hearing, tympanic membrane, ear canal and external ear normal. No middle ear effusion. No mastoid tenderness. Tympanic membrane is not injected, erythematous or bulging. Left Ear: Hearing, tympanic membrane, ear canal and external ear normal.  No middle ear effusion. No mastoid tenderness. Tympanic membrane is not injected, erythematous or bulging. Nose: Mucosal edema, congestion and rhinorrhea present. Right Sinus: No maxillary sinus tenderness or frontal sinus tenderness. Left Sinus: No maxillary sinus tenderness or frontal sinus tenderness. Mouth/Throat:      Lips: Pink. Mouth: Mucous membranes are moist.      Pharynx: Oropharynx is clear. Uvula midline. No pharyngeal swelling, oropharyngeal exudate, posterior oropharyngeal erythema or uvula swelling. Eyes:      General:         Right eye: No discharge. Left eye: No discharge. Conjunctiva/sclera: Conjunctivae normal.      Pupils: Pupils are equal, round, and reactive to light. Cardiovascular:      Rate and Rhythm: Normal rate and regular rhythm. Heart sounds: Normal heart sounds, S1 normal and S2 normal. No murmur. No friction rub. No gallop. Pulmonary:      Effort: Pulmonary effort is normal. No accessory muscle usage or respiratory distress. Breath sounds: Normal breath sounds and air entry. No decreased breath sounds, wheezing, rhonchi or rales. Comments: Occasional dry cough. Breath sounds clear B/L anterior and posterior lobes. Chest expansion symmetrical.  No audible wheezing or respiratory distress. No rales or rhonchi.    Abdominal:      General: Bowel sounds are normal.      Palpations: Abdomen is soft. Tenderness: There is no abdominal tenderness. Musculoskeletal: Normal range of motion. Lymphadenopathy:      Cervical: No cervical adenopathy. Right cervical: No superficial or posterior cervical adenopathy. Left cervical: No superficial or posterior cervical adenopathy. Skin:     General: Skin is warm and dry. Coloration: Skin is not pale. Findings: No erythema or rash. Neurological:      Mental Status: He is alert and oriented to person, place, and time. Psychiatric:         Behavior: Behavior normal. Behavior is cooperative. Assessment/Plan:  1. Fever, unspecified fever cause  - Covid-19 Ambulatory; Future    2. Suspected COVID-19 virus infection  - Covid-19 Ambulatory; Future      · Practice meticulous handwashing and cover cough to prevent spread of infection. · Advised to quarantine self at home until receives results from COVID-19 - will call with results. · Do not return to work or school until symptoms have resolved and no fever for 24 hrs without medication. · Initiated Get Well Loop  · Encouraged to increase fluids and rest  · Tylenol OTC PRN for pain, discomfort or fever as directed on package  · Cool mist humidifier  · Hot tea with honey and lemon for cough PRN  · Patient instructions given for Covid 19 infection. .  · To ER or call 911 if any increasing difficulty breathing, shortness of breath, inability to swallow, hives, rash, facial/tongue swelling or temp greater than 103 degrees. · Follow up with PCP or Flu Clinic as needed if symptoms worsen or do not improve. AIRAM Adams - SUJATHA  11/30/20     This visit was provided as a focused evaluation during the COVID -19 pandemic/national emergency. A comprehensive review of all previous patient history and testing was not conducted. Pertinent findings were elicited during the visit.

## 2020-12-04 LAB — SARS-COV-2, NAA: DETECTED

## 2020-12-07 ENCOUNTER — TELEPHONE (OUTPATIENT)
Dept: FAMILY MEDICINE CLINIC | Age: 40
End: 2020-12-07

## 2020-12-07 NOTE — TELEPHONE ENCOUNTER
Note printed and patient notified.      Letter faxed to [facility]

## 2020-12-07 NOTE — TELEPHONE ENCOUNTER
Patient called in stating he was tested on 11/30/2020 and got positive results on 12/1/2020. He states he needs a note to return to work on 12/14/2020. He states his wife and mother in law all tested positive and wants to make sure he is feeling better before he goes back . Please advise.

## 2021-02-11 ENCOUNTER — OFFICE VISIT (OUTPATIENT)
Dept: FAMILY MEDICINE CLINIC | Age: 41
End: 2021-02-11
Payer: COMMERCIAL

## 2021-02-11 VITALS
DIASTOLIC BLOOD PRESSURE: 82 MMHG | TEMPERATURE: 98.3 F | WEIGHT: 187 LBS | SYSTOLIC BLOOD PRESSURE: 124 MMHG | OXYGEN SATURATION: 98 % | BODY MASS INDEX: 24.67 KG/M2 | HEART RATE: 68 BPM

## 2021-02-11 DIAGNOSIS — F51.01 PRIMARY INSOMNIA: ICD-10-CM

## 2021-02-11 PROCEDURE — 99214 OFFICE O/P EST MOD 30 MIN: CPT | Performed by: NURSE PRACTITIONER

## 2021-02-11 RX ORDER — TRAZODONE HYDROCHLORIDE 100 MG/1
100 TABLET ORAL NIGHTLY
Qty: 90 TABLET | Refills: 1 | Status: SHIPPED | OUTPATIENT
Start: 2021-02-11 | End: 2021-07-29 | Stop reason: SDUPTHER

## 2021-02-11 ASSESSMENT — PATIENT HEALTH QUESTIONNAIRE - PHQ9
SUM OF ALL RESPONSES TO PHQ QUESTIONS 1-9: 2
2. FEELING DOWN, DEPRESSED OR HOPELESS: 1
1. LITTLE INTEREST OR PLEASURE IN DOING THINGS: 1
SUM OF ALL RESPONSES TO PHQ9 QUESTIONS 1 & 2: 2
SUM OF ALL RESPONSES TO PHQ QUESTIONS 1-9: 2

## 2021-02-11 ASSESSMENT — ENCOUNTER SYMPTOMS
COUGH: 0
DIARRHEA: 0
NAUSEA: 0
VOMITING: 0
SHORTNESS OF BREATH: 0

## 2021-02-11 NOTE — PROGRESS NOTES
disturbance. Negative for self-injury and suicidal ideas. The patient is not nervous/anxious. Physical Exam:     Vitals:  /82   Pulse 68   Temp 98.3 °F (36.8 °C) (Oral)   Wt 187 lb (84.8 kg)   SpO2 98%   BMI 24.67 kg/m²       Physical Exam  Vitals signs and nursing note reviewed. Constitutional:       Appearance: He is well-developed. Cardiovascular:      Rate and Rhythm: Normal rate and regular rhythm. Heart sounds: S1 normal and S2 normal.   Pulmonary:      Effort: Pulmonary effort is normal. No respiratory distress. Breath sounds: Normal breath sounds. Abdominal:      General: Bowel sounds are normal.      Palpations: Abdomen is soft. Tenderness: There is no abdominal tenderness. Skin:     General: Skin is warm and dry. Psychiatric:         Behavior: Behavior is cooperative. Data:     Lab Results   Component Value Date     02/13/2020    K 4.6 02/13/2020     02/13/2020    CO2 26 02/13/2020    BUN 14 02/13/2020    CREATININE 1.10 02/13/2020    GLUCOSE 106 02/13/2020    PROT 8.0 02/13/2020    LABALBU 5.1 02/13/2020    BILITOT 0.37 02/13/2020    ALKPHOS 86 02/13/2020    AST 25 02/13/2020    ALT 28 02/13/2020     Lab Results   Component Value Date    WBC 7.4 02/13/2020    RBC 4.84 02/13/2020    HGB 14.6 02/13/2020    HCT 43.5 02/13/2020    MCV 89.9 02/13/2020    MCH 30.3 02/13/2020    MCHC 33.7 02/13/2020    RDW 14.0 02/13/2020     02/13/2020    MPV NOT REPORTED 02/13/2020     No results found for: TSH  No results found for: CHOL, HDL, PSA, LABA1C       Assessment & Plan        Diagnosis Orders   1. Primary insomnia  traZODone (DESYREL) 100 MG tablet     Will increase trazodone to 100mg PO nightly. Continue to monitor symptoms. Continue good sleep hygiene. Patient verbalizes understanding and agreement with plan. All questions answered. If symptoms do not resolve or worsen, return to office.                Completed Refills   Requested Prescriptions     Signed Prescriptions Disp Refills    traZODone (DESYREL) 100 MG tablet 90 tablet 1     Sig: Take 1 tablet by mouth nightly     No follow-ups on file. Orders Placed This Encounter   Medications    traZODone (DESYREL) 100 MG tablet     Sig: Take 1 tablet by mouth nightly     Dispense:  90 tablet     Refill:  1     No orders of the defined types were placed in this encounter. There are no Patient Instructions on file for this visit. Electronically signed by AIRAM Dunbar CNP on 2/11/2021 at 4:23 PM           Completed Refills      Requested Prescriptions     Signed Prescriptions Disp Refills    traZODone (DESYREL) 100 MG tablet 90 tablet 1     Sig: Take 1 tablet by mouth nightly         Marty Bridges received counseling on the following healthy behaviors: nutrition and medication adherence  Reviewed prior labs and health maintenance. Continue current medications, diet and exercise. Discussed use, benefit, and side effects of prescribed medications. Barriers to medication compliance addressed. Patient given educational materials - see patient instructions. All patient questions answered. Patient voiced understanding.

## 2021-06-15 ENCOUNTER — OFFICE VISIT (OUTPATIENT)
Dept: FAMILY MEDICINE CLINIC | Age: 41
End: 2021-06-15
Payer: COMMERCIAL

## 2021-06-15 VITALS
TEMPERATURE: 98.2 F | HEART RATE: 78 BPM | SYSTOLIC BLOOD PRESSURE: 136 MMHG | OXYGEN SATURATION: 98 % | DIASTOLIC BLOOD PRESSURE: 82 MMHG | WEIGHT: 187 LBS | BODY MASS INDEX: 24.67 KG/M2

## 2021-06-15 DIAGNOSIS — H01.00B BLEPHARITIS OF UPPER AND LOWER EYELIDS OF BOTH EYES, UNSPECIFIED TYPE: Primary | ICD-10-CM

## 2021-06-15 DIAGNOSIS — H01.00A BLEPHARITIS OF UPPER AND LOWER EYELIDS OF BOTH EYES, UNSPECIFIED TYPE: Primary | ICD-10-CM

## 2021-06-15 PROCEDURE — 99213 OFFICE O/P EST LOW 20 MIN: CPT | Performed by: NURSE PRACTITIONER

## 2021-06-15 RX ORDER — ERYTHROMYCIN 5 MG/G
OINTMENT OPHTHALMIC 2 TIMES DAILY
Qty: 3.5 G | Refills: 2 | Status: SHIPPED | OUTPATIENT
Start: 2021-06-15 | End: 2021-12-06

## 2021-06-15 SDOH — ECONOMIC STABILITY: FOOD INSECURITY: WITHIN THE PAST 12 MONTHS, THE FOOD YOU BOUGHT JUST DIDN'T LAST AND YOU DIDN'T HAVE MONEY TO GET MORE.: NEVER TRUE

## 2021-06-15 SDOH — ECONOMIC STABILITY: FOOD INSECURITY: WITHIN THE PAST 12 MONTHS, YOU WORRIED THAT YOUR FOOD WOULD RUN OUT BEFORE YOU GOT MONEY TO BUY MORE.: NEVER TRUE

## 2021-06-15 ASSESSMENT — ENCOUNTER SYMPTOMS
VOMITING: 0
EYE PAIN: 1
SHORTNESS OF BREATH: 0
NAUSEA: 0
DIARRHEA: 0
EYE DISCHARGE: 1
COUGH: 0

## 2021-06-15 ASSESSMENT — SOCIAL DETERMINANTS OF HEALTH (SDOH): HOW HARD IS IT FOR YOU TO PAY FOR THE VERY BASICS LIKE FOOD, HOUSING, MEDICAL CARE, AND HEATING?: NOT HARD AT ALL

## 2021-06-15 ASSESSMENT — VISUAL ACUITY: OU: 1

## 2021-06-15 NOTE — PROGRESS NOTES
erythromycin ointment. Continue to wash with John's baby shampoo. Will fill out FMLA paper work for 2 days twice a month. Patient verbalizes understanding and agreement with plan. All questions answered. If symptoms do not resolve or worsen, return to office. Completed Refills   Requested Prescriptions      No prescriptions requested or ordered in this encounter     No follow-ups on file. No orders of the defined types were placed in this encounter. No orders of the defined types were placed in this encounter. Patient Instructions       Patient Education        Blepharitis: Care Instructions  Your Care Instructions     Blepharitis is an inflammation or infection of the eyelids. It causes dry, scaly crusts on the eyelids. It can also cause your eyes to itch, burn, and look red. This problem is more common in people who have rosacea, dandruff, skin allergies, or eczema. Home treatment can help you keep your eyes comfortable. Your doctor may also prescribe an ointment to put on your eyelids. Follow-up care is a key part of your treatment and safety. Be sure to make and go to all appointments, and call your doctor if you are having problems. It's also a good idea to know your test results and keep a list of the medicines you take. How can you care for yourself at home? · Wash your eyelids and eyebrows daily with baby shampoo. To wash your eyelids:  ? Place a very warm washcloth over your eyes for about a minute. This will help soften and loosen the crusts on your eyelashes. ? Put a few drops of baby shampoo on a warm washcloth. ? Gently wipe your eyelids. This helps remove any crust. It also cleans your eyelids. ? Rinse well with water. · Be safe with medicines. If your doctor prescribed medicine for you, use it exactly as directed. Call your doctor if you think you are having a problem with your medicine. When should you call for help?    Call your doctor now or seek immediate medical care if:    · You have signs of an eye infection, such as:  ? Pus or thick discharge coming from the eye.  ? Redness or swelling around the eye.  ? A fever. Watch closely for changes in your health, and be sure to contact your doctor if:    · You have vision changes.     · You do not get better as expected. Where can you learn more? Go to [email or web address]://[email or web address] org and sign in to your [employer] account. Enter M851 in the [employer] box to learn more about \"Blepharitis: Care Instructions. \"     If you do not have an account, please click on the \"Sign Up Now\" link. Current as of: August 31, 2020               Content Version: 12.8  © 7620-2271 [employer], [employer]. Care instructions adapted under license by [facility] ([facility]). If you have questions about a medical condition or this instruction, always ask your healthcare professional. [place] [address] any warranty or liability for your use of this information. Electronically signed by AIRAM Dale CNP on 6/15/2021 at 9:48 AM           Completed Refills      Requested Prescriptions      No prescriptions requested or ordered in this encounter         Davidparesh Conklin received counseling on the following healthy behaviors: medication adherence  Reviewed prior labs and health maintenance. Continue current medications, diet and exercise. Discussed use, benefit, and side effects of prescribed medications. Barriers to medication compliance addressed. Patient given educational materials - see patient instructions. All patient questions answered. Patient voiced understanding.

## 2021-06-15 NOTE — PATIENT INSTRUCTIONS
Patient Education        Blepharitis: Care Instructions  Your Care Instructions     Blepharitis is an inflammation or infection of the eyelids. It causes dry, scaly crusts on the eyelids. It can also cause your eyes to itch, burn, and look red. This problem is more common in people who have rosacea, dandruff, skin allergies, or eczema. Home treatment can help you keep your eyes comfortable. Your doctor may also prescribe an ointment to put on your eyelids. Follow-up care is a key part of your treatment and safety. Be sure to make and go to all appointments, and call your doctor if you are having problems. It's also a good idea to know your test results and keep a list of the medicines you take. How can you care for yourself at home? · Wash your eyelids and eyebrows daily with baby shampoo. To wash your eyelids:  ? Place a very warm washcloth over your eyes for about a minute. This will help soften and loosen the crusts on your eyelashes. ? Put a few drops of baby shampoo on a warm washcloth. ? Gently wipe your eyelids. This helps remove any crust. It also cleans your eyelids. ? Rinse well with water. · Be safe with medicines. If your doctor prescribed medicine for you, use it exactly as directed. Call your doctor if you think you are having a problem with your medicine. When should you call for help? Call your doctor now or seek immediate medical care if:    · You have signs of an eye infection, such as:  ? Pus or thick discharge coming from the eye.  ? Redness or swelling around the eye.  ? A fever. Watch closely for changes in your health, and be sure to contact your doctor if:    · You have vision changes.     · You do not get better as expected. Where can you learn more? Go to [email or web address]://Quillpedarcieweb.[employer]ri [email or web address] org and sign in to your [employer] account. Enter M091 in the [employer] box to learn more about \"Blepharitis: Care Instructions. \"     If you do not have an account, please click on the \"Sign Up Now\" link. Current as of: August 31, 2020               Content Version: 12.8  © 2006-2021 Healthwise, Incorporated. Care instructions adapted under license by [facility] ([facility]). If you have questions about a medical condition or this instruction, always ask your healthcare professional. [address] any warranty or liability for your use of this information.

## 2021-07-28 DIAGNOSIS — F51.01 PRIMARY INSOMNIA: ICD-10-CM

## 2021-07-28 NOTE — TELEPHONE ENCOUNTER
Last OV: 6/15/2021 Blepharitis upper and lower eyelids  Last RX:  Next scheduled apt: Visit date not found    RX pending

## 2021-07-28 NOTE — TELEPHONE ENCOUNTER
Patient is asking for a reifll on Trazodone 100 mg. Patient last seen 6/15/21. No future appt found    Drug [address] Maintenance   Topic Date Due    Hepatitis C screen  Never done    Varicella vaccine (1 of 2 - 2-dose childhood series) Never done    COVID-19 Vaccine (1) Never done    HIV screen  Never done    DTaP/Tdap/Td vaccine (1 - Tdap) Never done    Lipid screen  Never done    Flu vaccine (1) 09/01/2021    Hepatitis A vaccine  Aged Out    Hepatitis B vaccine  Aged Out    Hib vaccine  Aged Out    Meningococcal (ACWY) vaccine  Aged Out    Pneumococcal 0-64 years Vaccine  Aged Out             (applicable per patient's age: Cancer Screenings, Depression Screening, Fall Risk Screening, Immunizations)    AST (U/L)   Date Value   02/13/2020 25     ALT (U/L)   Date Value   02/13/2020 28     BUN (mg/dL)   Date Value   02/13/2020 14      (goal A1C is < 7)   (goal LDL is <100) need 30-50% reduction from baseline     BP Readings from Last 3 Encounters:   06/15/21 136/82   02/11/21 124/82   11/30/20 (!) 138/100    (goal /80)      All Future Testing planned in [facility]:      Next Visit Date:  No future appointments. There is no problem list on file for this patient.

## 2021-07-29 RX ORDER — TRAZODONE HYDROCHLORIDE 100 MG/1
100 TABLET ORAL NIGHTLY
Qty: 90 TABLET | Refills: 1 | Status: SHIPPED | OUTPATIENT
Start: 2021-07-29 | End: 2022-02-03

## 2021-12-06 ENCOUNTER — HOSPITAL ENCOUNTER (OUTPATIENT)
Dept: PREADMISSION TESTING | Age: 41
Setting detail: SPECIMEN
Discharge: HOME | End: 2021-12-06
Payer: COMMERCIAL

## 2021-12-06 ENCOUNTER — OFFICE VISIT (OUTPATIENT)
Dept: PRIMARY CARE CLINIC | Age: 41
End: 2021-12-06
Payer: COMMERCIAL

## 2021-12-06 VITALS
WEIGHT: 187.3 LBS | RESPIRATION RATE: 18 BRPM | BODY MASS INDEX: 24.82 KG/M2 | HEART RATE: 66 BPM | HEIGHT: 73 IN | TEMPERATURE: 98.1 F | DIASTOLIC BLOOD PRESSURE: 80 MMHG | SYSTOLIC BLOOD PRESSURE: 115 MMHG | OXYGEN SATURATION: 100 %

## 2021-12-06 DIAGNOSIS — J06.9 VIRAL UPPER RESPIRATORY TRACT INFECTION: Primary | ICD-10-CM

## 2021-12-06 DIAGNOSIS — R05.9 COUGH: ICD-10-CM

## 2021-12-06 DIAGNOSIS — Z20.822 EXPOSURE TO COVID-19 VIRUS: ICD-10-CM

## 2021-12-06 DIAGNOSIS — R53.83 FATIGUE, UNSPECIFIED TYPE: ICD-10-CM

## 2021-12-06 PROCEDURE — U0003 INFECTIOUS AGENT DETECTION BY NUCLEIC ACID (DNA OR RNA); SEVERE ACUTE RESPIRATORY SYNDROME CORONAVIRUS 2 (SARS-COV-2) (CORONAVIRUS DISEASE [COVID-19]), AMPLIFIED PROBE TECHNIQUE, MAKING USE OF HIGH THROUGHPUT TECHNOLOGIES AS DESCRIBED BY CMS-2020-01-R: HCPCS

## 2021-12-06 PROCEDURE — C9803 HOPD COVID-19 SPEC COLLECT: HCPCS

## 2021-12-06 PROCEDURE — 99213 OFFICE O/P EST LOW 20 MIN: CPT | Performed by: NURSE PRACTITIONER

## 2021-12-06 PROCEDURE — U0005 INFEC AGEN DETEC AMPLI PROBE: HCPCS

## 2021-12-06 NOTE — LETTER
[facility] In  [facility] [address]  Phone: Samson Horta [phone], APRN - CNP      12/6/2021     Patient: Dariana Marquez   YOB: 1980       To Whom It May Concern: It is my medical opinion that Dariana Marquez should remain out of school/work while acutely ill and awaiting COVID-19 test results. Return to school/work with no retesting should be followed if test is negative AND meets these criteria as outlined by CDC/ODH:     a. No fever without the use of fever reducers for 24 hours  b. Improvement in symptoms     If tests positive for COVID-19, needs minimum of 10 days strict quarantine, improvement of symptoms and 24 hours fever free without fever reducing medications. If you have any questions or concerns, please don't hesitate to call.     Sincerely,          Eveline Rowe, APRN - CNP

## 2021-12-06 NOTE — PROGRESS NOTES
Chief Complaint   Cough (x one day productive cough, headache, fatigue exposed to COVID )      History of Present Illness   Source of history provided by: patientHarjinder Schaffer is a 39 y.o. old male who has a past medical history of: No past medical history on file. Presents to the clinic for evaluation of 1 day of a productive cough with headache, fatigue and concern for exposure to COVID-19. Denies any  CP, dyspnea, LE edema, abdominal pain, vomiting, rash, or lethargy. \"People I work with have Covid-19\" with last exposure last week. According to patient he has had history of COVID-19 infection November 2020. Today he is most concerned for COVID-19 as he has a root canal scheduled this week on Thursday and does not want to attend if he is positive. ROS   Pertinent positives and negatives are stated within HPI, all other systems reviewed and are negative. Surgical History:  has no past surgical history on file. Social History:  reports that he quit smoking about 3 years ago. He smoked 1.00 pack per day. He has never used smokeless tobacco. He reports that he does not drink alcohol and does not use drugs. Family History: family history is not on file. Allergies: Penicillins    Physical Exam    VS:  /80   Pulse 66   Temp 98.1 °F (36.7 °C) (Oral)   Resp 18   Ht 6' 1\" (1.854 m)   Wt 187 lb 4.8 oz (85 kg)   SpO2 100%   BMI 24.71 kg/m²      Constitutional:  Alert, development consistent with age. NAD. Head:  NC/NT. Airway patent. Mouth: Posterior pharynx with mild erythema and clear postnasal drip. No tonsillar hypertrophy or exudate. Neck:  Normal ROM. Supple. No anterior cervical adenopathy noted. Lungs: CTAB without wheezes, rales, or rhonchi. CV:  Regular rate and rhythm, normal heart sounds, without pathological murmurs, ectopy, gallops, or rubs. Skin:  Normal turgor. Warm, dry, without visible rash. Lymphatic: No lymphangitis or adenopathy noted.   Neurological: Oriented. Motor functions intact. Lab / Imaging Results   (All laboratory and radiology results have been personally reviewed by myself)  Labs:  No results found for this visit on 12/06/21. Imaging: All Radiology results interpreted by Radiologist unless otherwise noted. No results found. Medical Decision Making   Pt non-toxic, in no apparent distress and stable at time of discharge. Assessment/Plan   Wilfrido Odell was seen today for cough. Diagnoses and all orders for this visit:    Viral upper respiratory tract infection    Exposure to COVID-19 virus  -     COVID-19; Future    Cough  -     COVID-19; Future    Fatigue, unspecified type  -     COVID-19; Future      39y.o. year old male presenting with concerns for COVID-19 after exposure and new onset of symptoms. Wilfrido Odell appears well, hydrated and with clear lung sounds without distress. COVID-19 outpatient order given with instructions to have done at [facility], will call with results once available. Symptomatic relief discussed including:  Sudafed 12HR 120mg twice daily (nasal decongestant), Flonase 1 spray each nostril twice daily (nasal steroid), Zyrtec 10mg Daily OR Claritin 10mg Daily (antihistamine), Ibuprofen 3 times a day (antiinflammatory), add a daily multivitamin as labeled over-the-counter that includes vitamin D, vitamin C and zinc, warm tea with 1tbsp honey (soothes the throat), Increase water intake, Rest.  Schedule virtual f/u with PCP in 7-10 days if symptoms persist. ED sooner if symptoms worsen or change. Jim Cortez, APRN - CNP    This visit was provided as a focused evaluation during the [place] -19 pandemic/national emergency. A comprehensive review of all previous patient history and testing was not conducted. Pertinent findings were elicited during the visit.

## 2021-12-06 NOTE — PATIENT INSTRUCTIONS
Patient Education        Learning About Coronavirus [phone]  What is coronavirus (COVID-19)? COVID-19 is a disease caused by a type of coronavirus. This illness was first found in December 2019. It has since spread worldwide. Coronaviruses are a large group of viruses. They cause the common cold. They also cause more serious illnesses like [place] respiratory syndrome (MERS) and severe acute respiratory syndrome (SARS). COVID-19 is caused by a novel coronavirus. That means it's a new type that has not been seen in people before. What are the symptoms? COVID-19 symptoms may include:  · Fever. · Cough. · Trouble breathing. · Chills or repeated shaking with chills. · Muscle and body aches. · Headache. · Sore throat. · New loss of taste or smell. · Vomiting. · Diarrhea. In severe cases, COVID-19 can cause pneumonia and make it hard to breathe without help from a machine. It can cause death. How is it diagnosed? COVID-19 is diagnosed with a viral test. This may also be called a PCR test or antigen test. It looks for evidence of the virus in your breathing passages or lungs (respiratory system). The test is most often done on a sample from the nose, throat, or lungs. It's sometimes done on a sample of saliva. One way a sample is collected is by putting a long swab into the back of your nose. How is it treated? Mild cases of COVID-19 can be treated at home. Serious cases need treatment in the hospital. Treatment may include medicines to reduce symptoms, plus breathing support such as oxygen therapy or a ventilator. Some people may be placed on their belly to help their oxygen levels. Treatments that may help people who have COVID-19 include:  Antiviral medicines. These medicines treat viral infections. Remdesivir is an example. Immune-based therapy. These medicines help the immune system fight COVID-19. Examples include monoclonal antibodies. Blood thinners.    These medicines help prevent blood clots. People with severe illness are at risk for blood clots. How can you protect yourself and others? The best way to protect yourself from getting sick is to:  · Get vaccinated. · Avoid sick people. · If you are not fully vaccinated:  ? Wear a mask if you have to go to public areas. ? Avoid crowds and try to stay at least 6 feet away from other people. · Cover your mouth with a tissue when you cough or sneeze. · Wash your hands often, especially after you cough or sneeze. Use soap and water, and scrub for at least 20 seconds. If soap and water aren't available, use an alcohol-based hand . · Avoid touching your mouth, nose, and eyes. To help avoid spreading the virus to others:  · Get vaccinated. · Cover your mouth with a tissue when you cough or sneeze. · Wash your hands often, especially after you cough or sneeze. Use soap and water, and scrub for at least 20 seconds. If soap and water aren't available, use an alcohol-based hand . · If you have been exposed to the virus and are not fully vaccinated:  ? Stay home. Don't go to school, work, or public areas. And don't use public transportation, ride-shares, or taxis unless you have no choice. ? Wear a mask if you have to go to public areas, like the pharmacy. · If you're sick:  ? Leave your home only if you need to get medical care. But call the doctor's office first so they know you're coming. And wear a mask. ? Wear a mask whenever you're around other people. ? Limit contact with pets and people in your home. If possible, stay in a separate bedroom and use a separate bathroom. ? Clean and disinfect your home every day. Use household  and disinfectant wipes or sprays. Take special care to clean things that you touch with your hands. How can you self-isolate when you have COVID-19? If you have COVID-19, there are things you can do to help avoid spreading the virus to others.   · Limit contact with people in your home. If possible, stay in a separate bedroom and use a separate bathroom. · Wear a mask when you are around other people. · If you have to leave home, avoid crowds and try to stay at least 6 feet away from other people. · Avoid contact with pets and other animals. · Cover your mouth and nose with a tissue when you cough or sneeze. Then throw it in the trash right away. · Wash your hands often, especially after you cough or sneeze. Use soap and water, and scrub for at least 20 seconds. If soap and water aren't available, use an alcohol-based hand . · Don't share personal household items. These include bedding, towels, cups and glasses, and eating utensils. · [address] in the warmest water allowed for the fabric type, and dry it completely. It's okay to wash other people's laundry with yours. · Clean and disinfect your home. Use household  and disinfectant wipes or sprays. When should you call for help? Call 911 anytime you think you may need emergency care. For example, call if you have life-threatening symptoms, such as:    · You have severe trouble breathing. (You can't talk at all.)     · You have constant chest pain or pressure.     · You are severely dizzy or lightheaded.     · You are confused or can't think clearly.     · You have pale, gray, or blue-colored skin or lips.     · You pass out (lose consciousness) or are very hard to wake up. Call your doctor now or seek immediate medical care if:    · You have moderate trouble breathing. (You can't speak a full sentence.)     · You are coughing up blood (more than about 1 teaspoon).     · You have signs of low blood pressure. These include feeling lightheaded; being too weak to stand; and having cold, pale, clammy skin.    Watch closely for changes in your health, and be sure to contact your doctor if:    · Your symptoms get worse.     · You are not getting better as expected.     · You have new or worse symptoms of anxiety, fever and  · Your symptoms are improving. If you tested positive but have no symptoms, you can end isolation after 10 days. But if you start to have symptoms, follow the steps above. Ask your doctor if you need to be tested before you end isolation. This is especially important if you have a weakened immune system. When should you call for help? Call 911 anytime you think you may need emergency care. For example, call if you have life-threatening symptoms, such as:    · You have severe trouble breathing. (You can't talk at all.)     · You have constant chest pain or pressure.     · You are severely dizzy or lightheaded.     · You are confused or can't think clearly.     · You have pale, gray, or blue-colored skin or lips.     · You pass out (lose consciousness) or are very hard to wake up. Call your doctor now or seek immediate medical care if:    · You have moderate trouble breathing. (You can't speak a full sentence.)     · You are coughing up blood (more than about 1 teaspoon).     · You have signs of low blood pressure. These include feeling lightheaded; being too weak to stand; and having cold, pale, clammy skin. Watch closely for changes in your health, and be sure to contact your doctor if:    · Your symptoms get worse.     · You are not getting better as expected.     · You have new or worse symptoms of anxiety, depression, nightmares, or flashbacks. Call before you go to the doctor's office. Follow their instructions. And wear a mask. Current as of: July 1, 2021               Content Version: 13.0  © 2006-2021 [employer]. Care instructions adapted under license by [facility] [place] ([facility]). If you have questions about a medical condition or this instruction, always ask your healthcare professional. [place] [address] any warranty or liability for your use of this information.

## 2021-12-07 LAB
SARS-COV-2: NORMAL
SARS-COV-2: NOT DETECTED
SOURCE: NORMAL

## 2022-02-03 DIAGNOSIS — F51.01 PRIMARY INSOMNIA: ICD-10-CM

## 2022-02-03 RX ORDER — TRAZODONE HYDROCHLORIDE 100 MG/1
100 TABLET ORAL NIGHTLY
Qty: 90 TABLET | Refills: 1 | Status: SHIPPED | OUTPATIENT
Start: 2022-02-03 | End: 2022-08-15 | Stop reason: SDUPTHER

## 2022-08-15 ENCOUNTER — OFFICE VISIT (OUTPATIENT)
Dept: FAMILY MEDICINE CLINIC | Age: 42
End: 2022-08-15
Payer: COMMERCIAL

## 2022-08-15 VITALS
SYSTOLIC BLOOD PRESSURE: 128 MMHG | HEART RATE: 74 BPM | TEMPERATURE: 98.5 F | DIASTOLIC BLOOD PRESSURE: 80 MMHG | OXYGEN SATURATION: 98 % | BODY MASS INDEX: 21.64 KG/M2 | WEIGHT: 164 LBS

## 2022-08-15 DIAGNOSIS — H01.00B BLEPHARITIS OF UPPER AND LOWER EYELIDS OF BOTH EYES, UNSPECIFIED TYPE: ICD-10-CM

## 2022-08-15 DIAGNOSIS — F51.01 PRIMARY INSOMNIA: Primary | ICD-10-CM

## 2022-08-15 DIAGNOSIS — H01.00A BLEPHARITIS OF UPPER AND LOWER EYELIDS OF BOTH EYES, UNSPECIFIED TYPE: ICD-10-CM

## 2022-08-15 PROCEDURE — 99213 OFFICE O/P EST LOW 20 MIN: CPT | Performed by: NURSE PRACTITIONER

## 2022-08-15 RX ORDER — TRAZODONE HYDROCHLORIDE 100 MG/1
100 TABLET ORAL NIGHTLY
Qty: 90 TABLET | Refills: 3 | Status: SHIPPED | OUTPATIENT
Start: 2022-08-15

## 2022-08-15 SDOH — ECONOMIC STABILITY: FOOD INSECURITY: WITHIN THE PAST 12 MONTHS, YOU WORRIED THAT YOUR FOOD WOULD RUN OUT BEFORE YOU GOT MONEY TO BUY MORE.: NEVER TRUE

## 2022-08-15 SDOH — ECONOMIC STABILITY: FOOD INSECURITY: WITHIN THE PAST 12 MONTHS, THE FOOD YOU BOUGHT JUST DIDN'T LAST AND YOU DIDN'T HAVE MONEY TO GET MORE.: NEVER TRUE

## 2022-08-15 ASSESSMENT — PATIENT HEALTH QUESTIONNAIRE - PHQ9
SUM OF ALL RESPONSES TO PHQ QUESTIONS 1-9: 0
1. LITTLE INTEREST OR PLEASURE IN DOING THINGS: 0
SUM OF ALL RESPONSES TO PHQ QUESTIONS 1-9: 0
2. FEELING DOWN, DEPRESSED OR HOPELESS: 0
SUM OF ALL RESPONSES TO PHQ QUESTIONS 1-9: 0
SUM OF ALL RESPONSES TO PHQ9 QUESTIONS 1 & 2: 0
SUM OF ALL RESPONSES TO PHQ QUESTIONS 1-9: 0

## 2022-08-15 ASSESSMENT — ENCOUNTER SYMPTOMS
COUGH: 0
VOMITING: 0
EYE REDNESS: 1
EYE PAIN: 1
EYE DISCHARGE: 1
SHORTNESS OF BREATH: 0
NAUSEA: 0
DIARRHEA: 0

## 2022-08-15 ASSESSMENT — SOCIAL DETERMINANTS OF HEALTH (SDOH): HOW HARD IS IT FOR YOU TO PAY FOR THE VERY BASICS LIKE FOOD, HOUSING, MEDICAL CARE, AND HEATING?: NOT HARD AT ALL

## 2022-08-15 NOTE — PROGRESS NOTES
HPI Notes    Name: Lisa Aj  : 1980         Chief Complaint:     Chief Complaint   Patient presents with    Insomnia     Patient here today for refill on trazodone    Eye Problem     Patient here today for FMLA paperwork for blepharitis of eyes. History of Present Illness:        Eye Problem   Associated symptoms include an eye discharge and eye redness. Pertinent negatives include no fever, nausea or vomiting. Pt is a 40 yo male who presents for follow up. Pt is being treated with trazodone 100mg for sleep. Pt does not take it on the weekends. Medication is being very effective. Has continued blepharitis and styes on both eyes. Has been to the eye doctor and two specialists. Using baby shampoo to wash eyes once daily and uses a soft eyelid scrubber twice daily. Past Medical History:     No past medical history on file. Reviewed all health maintenance requirements and ordered appropriate tests  Health Maintenance Due   Topic Date Due    COVID-19 Vaccine (1) Never done    Varicella vaccine (1 of 2 - 2-dose childhood series) Never done    HIV screen  Never done    Hepatitis C screen  Never done    DTaP/Tdap/Td vaccine (1 - Tdap) Never done    Lipids  Never done       Past Surgical History:     No past surgical history on file. Medications:       Prior to Admission medications    Medication Sig Start Date End Date Taking? Authorizing Provider   traZODone (DESYREL) 100 MG tablet Take 1 tablet by mouth nightly 8/15/22  Yes Sania Iron, APRN - CNP        Allergies:       Penicillins    Social History:     Tobacco:    reports that he quit smoking about 4 years ago. His smoking use included cigarettes. He smoked an average of 1 pack per day. He has never used smokeless tobacco.  Alcohol:      reports no history of alcohol use. Drug Use:  reports no history of drug use. Family History:     No family history on file.     Review of Systems:         Review of Systems   Constitutional: Negative for chills and fever. Eyes:  Positive for pain, discharge and redness. Respiratory:  Negative for cough and shortness of breath. Cardiovascular:  Negative for chest pain and palpitations. Gastrointestinal:  Negative for diarrhea, nausea and vomiting. Neurological:  Negative for dizziness, seizures and headaches. Physical Exam:     Vitals:  /80   Pulse 74   Temp 98.5 °F (36.9 °C) (Oral)   Wt 164 lb (74.4 kg)   SpO2 98%   BMI 21.64 kg/m²       Physical Exam  Vitals and nursing note reviewed. Constitutional:       Appearance: He is well-developed. Eyes:      Comments: Left upper eyelid slightly edematous. No active infection. Eyelash margins without erythema. Cardiovascular:      Rate and Rhythm: Normal rate and regular rhythm. Heart sounds: S1 normal and S2 normal.   Pulmonary:      Effort: Pulmonary effort is normal. No respiratory distress. Breath sounds: Normal breath sounds. Abdominal:      General: Bowel sounds are normal.      Palpations: Abdomen is soft. Tenderness: There is no abdominal tenderness. Skin:     General: Skin is warm and dry. Psychiatric:         Behavior: Behavior is cooperative.              Data:     Lab Results   Component Value Date/Time     02/13/2020 10:04 AM    K 4.6 02/13/2020 10:04 AM     02/13/2020 10:04 AM    CO2 26 02/13/2020 10:04 AM    BUN 14 02/13/2020 10:04 AM    CREATININE 1.10 02/13/2020 10:04 AM    GLUCOSE 106 02/13/2020 10:04 AM    PROT 8.0 02/13/2020 10:04 AM    LABALBU 5.1 02/13/2020 10:04 AM    BILITOT 0.37 02/13/2020 10:04 AM    ALKPHOS 86 02/13/2020 10:04 AM    AST 25 02/13/2020 10:04 AM    ALT 28 02/13/2020 10:04 AM     Lab Results   Component Value Date/Time    WBC 7.4 02/13/2020 10:04 AM    RBC 4.84 02/13/2020 10:04 AM    HGB 14.6 02/13/2020 10:04 AM    HCT 43.5 02/13/2020 10:04 AM    MCV 89.9 02/13/2020 10:04 AM    MCH 30.3 02/13/2020 10:04 AM    MCHC 33.7 02/13/2020 10:04 AM    RDW 14.0 02/13/2020 10:04 AM     02/13/2020 10:04 AM    MPV NOT REPORTED 02/13/2020 10:04 AM     No results found for: TSH  No results found for: CHOL, LDL, HDL, PSA, LABA1C       Assessment & Plan        Diagnosis Orders   1. Primary insomnia   --doing well. Continue trazodone 100mg nightly. Continue pill holiday. traZODone (DESYREL) 100 MG tablet      2. Blepharitis of upper and lower eyelids of both eyes, unspecified type   --continue treatment plan from eye doctor. Patient verbalizes understanding and agreement with plan. All questions answered. If symptoms do not resolve or worsen, return to office. Completed Refills   Requested Prescriptions     Signed Prescriptions Disp Refills    traZODone (DESYREL) 100 MG tablet 90 tablet 3     Sig: Take 1 tablet by mouth nightly     No follow-ups on file. Orders Placed This Encounter   Medications    traZODone (DESYREL) 100 MG tablet     Sig: Take 1 tablet by mouth nightly     Dispense:  90 tablet     Refill:  3     No orders of the defined types were placed in this encounter. Patient Instructions   SURVEY:    You may be receiving a survey from [employer] regarding your visit today. Please complete the survey to enable us to provide the highest quality of care to you and your family. If you cannot score us a very good (5 Stars) on any question, please call the office to discuss how we could have made your experience a very good one. Thank you.     Clinical Care Team: TOSHIA Tripp LPN    Clerical Team: Miko Do   Electronically signed by AIRAM Tripp CNP on 8/15/2022 at 3:28 PM           Completed Refills   Requested Prescriptions     Signed Prescriptions Disp Refills    traZODone (DESYREL) 100 MG tablet 90 tablet 3     Sig: Take 1 tablet by mouth nightly

## 2023-07-10 ENCOUNTER — TELEPHONE (OUTPATIENT)
Dept: FAMILY MEDICINE CLINIC | Age: 43
End: 2023-07-10

## 2023-07-10 RX ORDER — ERYTHROMYCIN 5 MG/G
OINTMENT OPHTHALMIC
Qty: 1 G | Refills: 0 | Status: SHIPPED | OUTPATIENT
Start: 2023-07-10 | End: 2023-07-20

## 2023-07-10 NOTE — TELEPHONE ENCOUNTER
Patient scheduled an appt for 7/12/23. He is c/o a stye. Patient would like to see if medication he had last time can be called in for him? Maybe called erythromycin 5 MG/GM ophthalmic ointment. [facility] [place] Maintenance   Topic Date Due    COVID-19 Vaccine (1) Never done    Varicella vaccine (1 of 2 - 2-dose childhood series) Never done    HIV screen  Never done    Hepatitis C screen  Never done    DTaP/Tdap/Td vaccine (1 - Tdap) Never done    Lipids  Never done    Flu vaccine (1) 08/01/2023    Depression Screen  08/15/2023    Hepatitis A vaccine  Aged Out    Hib vaccine  Aged Out    Meningococcal (ACWY) vaccine  Aged Out    Pneumococcal 0-64 years Vaccine  Aged Out             (applicable per patient's age: Cancer Screenings, Depression Screening, Fall Risk Screening, Immunizations)    AST (U/L)   Date Value   02/13/2020 25     ALT (U/L)   Date Value   02/13/2020 28     BUN (mg/dL)   Date Value   02/13/2020 14      (goal A1C is < 7)   (goal LDL is <100) need 30-50% reduction from baseline     BP Readings from Last 3 Encounters:   08/15/22 128/80   12/06/21 115/80   06/15/21 136/82    (goal /80)      All Future Testing planned in [facility]:      Next Visit Date:  Future Appointments   Date Time Provider [address]  [facility]   7/12/2023 10:20 AM AIRAM Gomez CNP W   8/17/2023  3:00 PM AIRAM Gomez CNP W            There is no problem list on file for this patient.

## 2023-07-12 ENCOUNTER — OFFICE VISIT (OUTPATIENT)
Dept: FAMILY MEDICINE CLINIC | Age: 43
End: 2023-07-12
Payer: COMMERCIAL

## 2023-07-12 VITALS
SYSTOLIC BLOOD PRESSURE: 110 MMHG | HEART RATE: 60 BPM | HEIGHT: 73 IN | WEIGHT: 155 LBS | DIASTOLIC BLOOD PRESSURE: 68 MMHG | BODY MASS INDEX: 20.54 KG/M2 | OXYGEN SATURATION: 98 % | TEMPERATURE: 98.3 F

## 2023-07-12 DIAGNOSIS — Z13.220 SCREENING FOR HYPERLIPIDEMIA: ICD-10-CM

## 2023-07-12 DIAGNOSIS — F51.01 PRIMARY INSOMNIA: ICD-10-CM

## 2023-07-12 DIAGNOSIS — Z13.0 SCREENING FOR DEFICIENCY ANEMIA: ICD-10-CM

## 2023-07-12 DIAGNOSIS — Z13.1 ENCOUNTER FOR SCREENING EXAMINATION FOR IMPAIRED GLUCOSE REGULATION AND DIABETES MELLITUS: ICD-10-CM

## 2023-07-12 DIAGNOSIS — H00.022 HORDEOLUM INTERNUM OF RIGHT LOWER EYELID: Primary | ICD-10-CM

## 2023-07-12 PROCEDURE — 99214 OFFICE O/P EST MOD 30 MIN: CPT | Performed by: NURSE PRACTITIONER

## 2023-07-12 RX ORDER — TRAZODONE HYDROCHLORIDE 100 MG/1
100 TABLET ORAL NIGHTLY
Qty: 90 TABLET | Refills: 3 | Status: SHIPPED | OUTPATIENT
Start: 2023-07-12

## 2023-07-12 SDOH — ECONOMIC STABILITY: TRANSPORTATION INSECURITY
IN THE PAST 12 MONTHS, HAS LACK OF TRANSPORTATION KEPT YOU FROM MEETINGS, WORK, OR FROM GETTING THINGS NEEDED FOR DAILY LIVING?: NO

## 2023-07-12 SDOH — ECONOMIC STABILITY: FOOD INSECURITY: WITHIN THE PAST 12 MONTHS, YOU WORRIED THAT YOUR FOOD WOULD RUN OUT BEFORE YOU GOT MONEY TO BUY MORE.: PATIENT DECLINED

## 2023-07-12 SDOH — ECONOMIC STABILITY: INCOME INSECURITY: HOW HARD IS IT FOR YOU TO PAY FOR THE VERY BASICS LIKE FOOD, HOUSING, MEDICAL CARE, AND HEATING?: PATIENT DECLINED

## 2023-07-12 SDOH — ECONOMIC STABILITY: FOOD INSECURITY: WITHIN THE PAST 12 MONTHS, THE FOOD YOU BOUGHT JUST DIDN'T LAST AND YOU DIDN'T HAVE MONEY TO GET MORE.: PATIENT DECLINED

## 2023-07-12 SDOH — ECONOMIC STABILITY: HOUSING INSECURITY
IN THE LAST 12 MONTHS, WAS THERE A TIME WHEN YOU DID NOT HAVE A STEADY PLACE TO SLEEP OR SLEPT IN A SHELTER (INCLUDING NOW)?: NO

## 2023-07-12 ASSESSMENT — ENCOUNTER SYMPTOMS
NAUSEA: 0
SHORTNESS OF BREATH: 0
VOMITING: 0
DIARRHEA: 0
EYE REDNESS: 1
COUGH: 0

## 2023-07-12 ASSESSMENT — PATIENT HEALTH QUESTIONNAIRE - PHQ9
1. LITTLE INTEREST OR PLEASURE IN DOING THINGS: 0
SUM OF ALL RESPONSES TO PHQ9 QUESTIONS 1 & 2: 0
1. LITTLE INTEREST OR PLEASURE IN DOING THINGS: NOT AT ALL
SUM OF ALL RESPONSES TO PHQ9 QUESTIONS 1 & 2: 0
2. FEELING DOWN, DEPRESSED OR HOPELESS: 0
SUM OF ALL RESPONSES TO PHQ QUESTIONS 1-9: 0
2. FEELING DOWN, DEPRESSED OR HOPELESS: NOT AT ALL
SUM OF ALL RESPONSES TO PHQ QUESTIONS 1-9: 0

## 2023-07-12 ASSESSMENT — VISUAL ACUITY: OU: 1

## 2023-07-12 NOTE — PROGRESS NOTES
Screening for hyperlipidemia  Lipid Panel      4. Encounter for screening examination for impaired glucose regulation and diabetes mellitus  Comprehensive Metabolic Panel      5. Screening for deficiency anemia  CBC with Auto Differential        Pt educated about preventative health. Will send for labs. Patient verbalizes understanding and agreement with plan. All questions answered. If symptoms do not resolve or worsen, return to office. Completed Refills   Requested Prescriptions     Signed Prescriptions Disp Refills    traZODone (DESYREL) 100 MG tablet 90 tablet 3     Sig: Take 1 tablet by mouth nightly     No follow-ups on file. Orders Placed This Encounter   Medications    traZODone (DESYREL) 100 MG tablet     Sig: Take 1 tablet by mouth nightly     Dispense:  90 tablet     Refill:  3     Orders Placed This Encounter   Procedures    CBC with Auto Differential     Standing Status:   Future     Standing Expiration Date:   8/15/2024    Comprehensive Metabolic Panel     Standing Status:   Future     Standing Expiration Date:   8/15/2024    Lipid Panel     Standing Status:   Future     Standing Expiration Date:   8/15/2024         There are no Patient Instructions on file for this visit. Electronically signed by Scottie Del Cid  on 7/12/2023 at 11:03 AM           Completed Refills   Requested Prescriptions     Signed Prescriptions Disp Refills    traZODone (DESYREL) 100 MG tablet 90 tablet 3     Sig: Take 1 tablet by mouth nightly

## 2023-12-11 ENCOUNTER — TELEPHONE (OUTPATIENT)
Dept: FAMILY MEDICINE CLINIC | Age: 43
End: 2023-12-11

## 2023-12-11 NOTE — TELEPHONE ENCOUNTER
Selin Vital missed work on 11/27 and 11/28 because he had 2 styes in his eyes. He said he woke up Thursday on Thanksgiving morning with one on his left eye and Sunday his right eye. He missed work that follow up Monday and Tuesday. Selin Vital has FMLA but his employer is giving him a hard time. Can he get a note from [address] him for those 2 days? Please let Selin Vital know. Health Maintenance   Topic Date Due    Hepatitis B vaccine (1 of 3 - 3-dose series) Never done    Varicella vaccine (1 of 2 - 2-dose childhood series) Never done    HIV screen  Never done    Hepatitis C screen  Never done    DTaP/Tdap/Td vaccine (1 - Tdap) Never done    Lipids  Never done    Flu vaccine (1) Never done    COVID-19 Vaccine (3 - 2023-24 season) 09/01/2023    Depression Screen  07/12/2024    Hepatitis A vaccine  Aged Out    Hib vaccine  Aged Out    HPV vaccine  Aged Out    Meningococcal (ACWY) vaccine  Aged Out    Pneumococcal 0-64 years Vaccine  Aged Out             (applicable per patient's age: Cancer Screenings, Depression Screening, Fall Risk Screening, Immunizations)    AST (U/L)   Date Value   02/13/2020 25     ALT (U/L)   Date Value   02/13/2020 28     BUN (mg/dL)   Date Value   02/13/2020 14      (goal A1C is < 7)   (goal LDL is <100) need 30-50% reduction from baseline     BP Readings from Last 3 Encounters:   07/12/23 110/68   08/15/22 128/80   12/06/21 115/80    (goal /80)      All Future Testing planned in [facility]:  Lab Frequency Next Occurrence   CBC with Auto Differential Once 07/11/2024   Comprehensive Metabolic Panel Once 08/00/2179   Lipid Panel Once 07/11/2024       Next Visit Date:  No future appointments. There is no problem list on file for this patient.

## 2024-07-05 ENCOUNTER — TELEPHONE (OUTPATIENT)
Dept: FAMILY MEDICINE CLINIC | Age: 44
End: 2024-07-05

## 2024-07-05 NOTE — TELEPHONE ENCOUNTER
Pt called stating that for the last week he has had heartburn very bad. He has taken OTC Nexium which has helped. Pt denies chest pain, left arm pain,and jaw pain. Pt also denies vomiting. Pt stated st the end of the day he does have a headache. Pt requesting medication called to [facility]. Please advise

## 2024-07-08 RX ORDER — OMEPRAZOLE 40 MG/1
40 CAPSULE, DELAYED RELEASE ORAL
Qty: 30 CAPSULE | Refills: 0 | Status: SHIPPED | OUTPATIENT
Start: 2024-07-08 | End: 2024-07-11 | Stop reason: SDUPTHER

## 2024-07-08 NOTE — TELEPHONE ENCOUNTER
Please let patient know that I did send in omeprazole 40 mg daily.  This medication is in the same class as Nexium, however would be better covered.  Take medication first in the morning on empty stomach.  30 to 60 minutes after taking medication may eat breakfast.  Needs a follow-up after taking medication for 2 weeks

## 2024-07-09 SDOH — ECONOMIC STABILITY: INCOME INSECURITY: HOW HARD IS IT FOR YOU TO PAY FOR THE VERY BASICS LIKE FOOD, HOUSING, MEDICAL CARE, AND HEATING?: PATIENT DECLINED

## 2024-07-09 SDOH — ECONOMIC STABILITY: FOOD INSECURITY: WITHIN THE PAST 12 MONTHS, THE FOOD YOU BOUGHT JUST DIDN'T LAST AND YOU DIDN'T HAVE MONEY TO GET MORE.: PATIENT DECLINED

## 2024-07-09 SDOH — ECONOMIC STABILITY: FOOD INSECURITY: WITHIN THE PAST 12 MONTHS, YOU WORRIED THAT YOUR FOOD WOULD RUN OUT BEFORE YOU GOT MONEY TO BUY MORE.: PATIENT DECLINED

## 2024-07-09 ASSESSMENT — PATIENT HEALTH QUESTIONNAIRE - PHQ9
SUM OF ALL RESPONSES TO PHQ QUESTIONS 1-9: 0
1. LITTLE INTEREST OR PLEASURE IN DOING THINGS: NOT AT ALL
1. LITTLE INTEREST OR PLEASURE IN DOING THINGS: NOT AT ALL
SUM OF ALL RESPONSES TO PHQ9 QUESTIONS 1 & 2: 0
SUM OF ALL RESPONSES TO PHQ QUESTIONS 1-9: 0
SUM OF ALL RESPONSES TO PHQ9 QUESTIONS 1 & 2: 0
2. FEELING DOWN, DEPRESSED OR HOPELESS: NOT AT ALL
2. FEELING DOWN, DEPRESSED OR HOPELESS: NOT AT ALL

## 2024-07-11 ENCOUNTER — OFFICE VISIT (OUTPATIENT)
Dept: FAMILY MEDICINE CLINIC | Age: 44
End: 2024-07-11
Payer: COMMERCIAL

## 2024-07-11 ENCOUNTER — HOSPITAL ENCOUNTER (OUTPATIENT)
Age: 44
Discharge: HOME | End: 2024-07-11
Payer: COMMERCIAL

## 2024-07-11 VITALS
OXYGEN SATURATION: 98 % | DIASTOLIC BLOOD PRESSURE: 70 MMHG | BODY MASS INDEX: 22.65 KG/M2 | SYSTOLIC BLOOD PRESSURE: 106 MMHG | WEIGHT: 170.9 LBS | HEART RATE: 64 BPM | HEIGHT: 73 IN

## 2024-07-11 DIAGNOSIS — Z13.1 ENCOUNTER FOR SCREENING EXAMINATION FOR IMPAIRED GLUCOSE REGULATION AND DIABETES MELLITUS: ICD-10-CM

## 2024-07-11 DIAGNOSIS — Z13.0 SCREENING FOR DEFICIENCY ANEMIA: ICD-10-CM

## 2024-07-11 DIAGNOSIS — K21.9 GASTROESOPHAGEAL REFLUX DISEASE WITHOUT ESOPHAGITIS: ICD-10-CM

## 2024-07-11 DIAGNOSIS — Z13.220 SCREENING FOR HYPERLIPIDEMIA: ICD-10-CM

## 2024-07-11 DIAGNOSIS — F51.01 PRIMARY INSOMNIA: Primary | ICD-10-CM

## 2024-07-11 LAB
ALBUMIN SERPL-MCNC: 4.2 G/DL (ref 3.5–5.2)
ALP SERPL-CCNC: 73 U/L (ref 40–129)
ALT SERPL-CCNC: 21 U/L (ref 5–41)
ANION GAP SERPL CALCULATED.3IONS-SCNC: 6 MMOL/L (ref 9–17)
AST SERPL-CCNC: 21 U/L
BASOPHILS # BLD: 0.04 K/UL (ref 0–0.2)
BASOPHILS NFR BLD: 1 % (ref 0–2)
BILIRUB SERPL-MCNC: 0.3 MG/DL (ref 0.3–1.2)
BUN SERPL-MCNC: 9 MG/DL (ref 6–20)
BUN/CREAT SERPL: 8 (ref 9–20)
CALCIUM SERPL-MCNC: 9.4 MG/DL (ref 8.6–10.4)
CHLORIDE SERPL-SCNC: 104 MMOL/L (ref 98–107)
CHOLEST SERPL-MCNC: 179 MG/DL (ref 0–199)
CHOLESTEROL/HDL RATIO: 3
CO2 SERPL-SCNC: 29 MMOL/L (ref 20–31)
CREAT SERPL-MCNC: 1.1 MG/DL (ref 0.7–1.2)
EOSINOPHIL # BLD: 0.11 K/UL (ref 0–0.4)
EOSINOPHILS RELATIVE PERCENT: 2 % (ref 0–5)
ERYTHROCYTE [DISTWIDTH] IN BLOOD BY AUTOMATED COUNT: 13.1 % (ref 12.1–15.2)
GFR, ESTIMATED: 85 ML/MIN/1.73M2
GLUCOSE SERPL-MCNC: 79 MG/DL (ref 70–99)
HCT VFR BLD AUTO: 42.2 % (ref 41–53)
HDLC SERPL-MCNC: 58 MG/DL
HGB BLD-MCNC: 14.4 G/DL (ref 13.5–17.5)
IMM GRANULOCYTES # BLD AUTO: 0.01 K/UL (ref 0–0.3)
IMM GRANULOCYTES NFR BLD: 0 % (ref 0–5)
LDLC SERPL CALC-MCNC: 102 MG/DL (ref 0–100)
LYMPHOCYTES NFR BLD: 1.94 K/UL (ref 1–4.8)
LYMPHOCYTES RELATIVE PERCENT: 26 % (ref 13–44)
MCH RBC QN AUTO: 31.2 PG (ref 26–34)
MCHC RBC AUTO-ENTMCNC: 34.1 G/DL (ref 31–37)
MCV RBC AUTO: 91.5 FL (ref 80–100)
MONOCYTES NFR BLD: 0.58 K/UL (ref 0–1)
MONOCYTES NFR BLD: 8 % (ref 5–9)
NEUTROPHILS NFR BLD: 63 % (ref 39–75)
NEUTS SEG NFR BLD: 4.66 K/UL (ref 2.1–6.5)
PLATELET # BLD AUTO: 245 K/UL (ref 140–450)
PMV BLD AUTO: 10.7 FL (ref 6–12)
POTASSIUM SERPL-SCNC: 4.3 MMOL/L (ref 3.7–5.3)
PROT SERPL-MCNC: 7.1 G/DL (ref 6.4–8.3)
RBC # BLD AUTO: 4.61 M/UL (ref 4.5–5.9)
SODIUM SERPL-SCNC: 139 MMOL/L (ref 135–144)
TRIGL SERPL-MCNC: 97 MG/DL
VLDLC SERPL CALC-MCNC: 19 MG/DL
WBC OTHER # BLD: 7.3 K/UL (ref 3.5–11)

## 2024-07-11 PROCEDURE — 36415 COLL VENOUS BLD VENIPUNCTURE: CPT

## 2024-07-11 PROCEDURE — 99214 OFFICE O/P EST MOD 30 MIN: CPT | Performed by: NURSE PRACTITIONER

## 2024-07-11 PROCEDURE — 80061 LIPID PANEL: CPT

## 2024-07-11 PROCEDURE — 85025 COMPLETE CBC W/AUTO DIFF WBC: CPT

## 2024-07-11 PROCEDURE — 80053 COMPREHEN METABOLIC PANEL: CPT

## 2024-07-11 RX ORDER — OMEPRAZOLE 40 MG/1
40 CAPSULE, DELAYED RELEASE ORAL
Qty: 30 CAPSULE | Refills: 2 | Status: SHIPPED | OUTPATIENT
Start: 2024-07-11

## 2024-07-11 RX ORDER — TRAZODONE HYDROCHLORIDE 100 MG/1
100 TABLET ORAL NIGHTLY
Qty: 90 TABLET | Refills: 3 | Status: SHIPPED | OUTPATIENT
Start: 2024-07-11

## 2024-07-11 ASSESSMENT — ENCOUNTER SYMPTOMS
VOMITING: 0
GLOBUS SENSATION: 1
DIARRHEA: 0
COUGH: 0
SHORTNESS OF BREATH: 0
HEARTBURN: 1
NAUSEA: 0

## 2024-07-11 NOTE — PROGRESS NOTES
10:04 AM    CREATININE 1.10 02/13/2020 10:04 AM    GLUCOSE 106 02/13/2020 10:04 AM    BILITOT 0.37 02/13/2020 10:04 AM    ALKPHOS 86 02/13/2020 10:04 AM    AST 25 02/13/2020 10:04 AM    ALT 28 02/13/2020 10:04 AM     Lab Results   Component Value Date/Time    WBC 7.4 02/13/2020 10:04 AM    RBC 4.84 02/13/2020 10:04 AM    HGB 14.6 02/13/2020 10:04 AM    HCT 43.5 02/13/2020 10:04 AM    MCV 89.9 02/13/2020 10:04 AM    MCH 30.3 02/13/2020 10:04 AM    MCHC 33.7 02/13/2020 10:04 AM    RDW 14.0 02/13/2020 10:04 AM     02/13/2020 10:04 AM    MPV NOT REPORTED 02/13/2020 10:04 AM     No results found for: \"TSH\"  No results found for: \"CHOL\", \"LDL\", \"HDL\", \"PSA\", \"LABA1C\"       Assessment & Plan        Diagnosis Orders   1. Primary insomnia   --well controlled with trazodone       2. Gastroesophageal reflux disease without esophagitis   --doing very well with PPI. Continue medication. Continue diet modification.                        Completed Refills   Requested Prescriptions     Pending Prescriptions Disp Refills    traZODone (DESYREL) 100 MG tablet 90 tablet 3     Sig: Take 1 tablet by mouth nightly     No follow-ups on file.  No orders of the defined types were placed in this encounter.    No orders of the defined types were placed in this encounter.        Patient Instructions     SURVEY:    You may be receiving a survey from [facility] regarding your visit today.    Please complete the survey to enable us to provide the highest quality of care to you and your family.    If you cannot score us a very good on any question, please call the office to discuss how we could have made your experience a very good one.    Thank you.     Electronically signed by Jalen Cox DNP,APRN,CNP  on 7/11/2024 at 8:52 AM           Completed Refills   Requested Prescriptions     Pending Prescriptions Disp Refills    traZODone (DESYREL) 100 MG tablet 90 tablet 3     Sig: Take 1 tablet by mouth nightly

## 2024-07-29 ENCOUNTER — TELEPHONE (OUTPATIENT)
Dept: FAMILY MEDICINE CLINIC | Age: 44
End: 2024-07-29

## 2024-07-29 NOTE — TELEPHONE ENCOUNTER
Patient states that he has had a pretty bad flare up of his heartburn - has been taking the medication regularly - patient not sure if he needs to be seen again

## 2024-07-30 NOTE — TELEPHONE ENCOUNTER
Tell pt he may try to increase the omeprazole 40mg to ONE BID-- make sure taking in AM and then in PM 30min prior to eating.   Also make sure decreasing caffiene, no spicy foods, keep citrus and red sauces at minimum for now and not eating 3hrs prior to sleeping.  If the increased Omeprazole not helping in the week then MAKE appt.  Or call sooner if has difficulty swallowing or nausea or vomiting etc.

## 2024-08-23 RX ORDER — OMEPRAZOLE 40 MG/1
40 CAPSULE, DELAYED RELEASE ORAL
Qty: 90 CAPSULE | Refills: 1 | Status: SHIPPED | OUTPATIENT
Start: 2024-08-23

## 2024-08-23 NOTE — TELEPHONE ENCOUNTER
Patient states that insurance is needing him to get his prescriptions as 90 days in order for them to pay for them - could we send a new script to [facility] for Omeprazole as a 90 day script

## 2024-09-12 RX ORDER — OMEPRAZOLE 40 MG/1
40 CAPSULE, DELAYED RELEASE ORAL 2 TIMES DAILY
Qty: 180 CAPSULE | Refills: 1 | Status: SHIPPED | OUTPATIENT
Start: 2024-09-12

## 2024-10-03 ENCOUNTER — OFFICE VISIT (OUTPATIENT)
Dept: FAMILY MEDICINE CLINIC | Age: 44
End: 2024-10-03
Payer: COMMERCIAL

## 2024-10-03 VITALS
OXYGEN SATURATION: 98 % | WEIGHT: 174 LBS | HEART RATE: 76 BPM | BODY MASS INDEX: 23.06 KG/M2 | SYSTOLIC BLOOD PRESSURE: 118 MMHG | HEIGHT: 73 IN | DIASTOLIC BLOOD PRESSURE: 74 MMHG

## 2024-10-03 DIAGNOSIS — K21.9 GASTROESOPHAGEAL REFLUX DISEASE WITHOUT ESOPHAGITIS: Primary | ICD-10-CM

## 2024-10-03 PROCEDURE — 99213 OFFICE O/P EST LOW 20 MIN: CPT | Performed by: NURSE PRACTITIONER

## 2024-10-03 RX ORDER — FAMOTIDINE 20 MG/1
20 TABLET, FILM COATED ORAL NIGHTLY
Qty: 30 TABLET | Refills: 2 | Status: SHIPPED | OUTPATIENT
Start: 2024-10-03

## 2024-10-03 ASSESSMENT — ENCOUNTER SYMPTOMS
GLOBUS SENSATION: 1
DIARRHEA: 0
COUGH: 0
SHORTNESS OF BREATH: 0
BLOOD IN STOOL: 0
HEARTBURN: 1
NAUSEA: 0
VOMITING: 0

## 2024-10-03 NOTE — PROGRESS NOTES
tablet Take 1 tablet by mouth nightly 7/11/24  Yes Jalen Cox, DNP        Allergies:       Penicillins    Social History:     Tobacco:    reports that he quit smoking about 6 years ago. His smoking use included cigarettes. He has been exposed to tobacco smoke. He has never used smokeless tobacco.  Alcohol:      reports no history of alcohol use.  Drug Use:  reports no history of drug use.    Family History:     No family history on file.    Review of Systems:         Review of Systems   Constitutional:  Negative for chills and fever.   Respiratory:  Negative for cough and shortness of breath.    Cardiovascular:  Negative for chest pain and palpitations.   Gastrointestinal:  Positive for heartburn (in evenings and laying down at night). Negative for blood in stool, diarrhea, nausea and vomiting.   Neurological:  Negative for dizziness, seizures and headaches.         Physical Exam:     Vitals:  /74 (Site: Right Upper Arm, Position: Sitting)   Pulse 76   Ht 1.854 m (6' 1\")   Wt 78.9 kg (174 lb)   SpO2 98%   BMI 22.96 kg/m²       Physical Exam  Vitals and nursing note reviewed.   Constitutional:       Appearance: He is well-developed.   Cardiovascular:      Rate and Rhythm: Normal rate and regular rhythm.      Heart sounds: S1 normal and S2 normal.   Pulmonary:      Effort: Pulmonary effort is normal. No respiratory distress.      Breath sounds: Normal breath sounds.   Abdominal:      General: Bowel sounds are normal.      Palpations: Abdomen is soft.      Tenderness: There is no abdominal tenderness.   Skin:     General: Skin is warm and dry.   Psychiatric:         Behavior: Behavior is cooperative.               Data:     Lab Results   Component Value Date/Time     07/11/2024 09:15 AM    K 4.3 07/11/2024 09:15 AM     07/11/2024 09:15 AM    CO2 29 07/11/2024 09:15 AM    BUN 9 07/11/2024 09:15 AM    CREATININE 1.1 07/11/2024 09:15 AM    GLUCOSE 79 07/11/2024 09:15 AM    BILITOT 0.3

## 2024-10-31 ENCOUNTER — OFFICE VISIT (OUTPATIENT)
Dept: FAMILY MEDICINE CLINIC | Age: 44
End: 2024-10-31

## 2024-10-31 VITALS — HEART RATE: 76 BPM | SYSTOLIC BLOOD PRESSURE: 112 MMHG | DIASTOLIC BLOOD PRESSURE: 74 MMHG | OXYGEN SATURATION: 97 %

## 2024-10-31 DIAGNOSIS — K21.9 GASTROESOPHAGEAL REFLUX DISEASE WITHOUT ESOPHAGITIS: Primary | ICD-10-CM

## 2024-10-31 ASSESSMENT — ENCOUNTER SYMPTOMS
COUGH: 0
HEARTBURN: 0
NAUSEA: 0
SHORTNESS OF BREATH: 0
DIARRHEA: 0
VOMITING: 0
GLOBUS SENSATION: 0
SORE THROAT: 0

## 2024-10-31 NOTE — PROGRESS NOTES
HPI Notes    Name: Pacheco Lloyd  : 1980         Chief Complaint:     Chief Complaint   Patient presents with    Gastroesophageal Reflux     1m f/u  Omeprazole 40mg and pepcid 20mg QD- pt notices improvement since last visit.       History of Present Illness:        Gastroesophageal Reflux  He reports no chest pain, no coughing, no globus sensation, no heartburn, no nausea or no sore throat. This is a recurrent problem. The current episode started more than 1 month ago. The problem occurs rarely. The problem has been gradually improving. Nothing aggravates the symptoms. Risk factors include caffeine use. He has tried a PPI and a histamine-2 antagonist for the symptoms. The treatment provided moderate relief.       Past Medical History:     No past medical history on file.   Reviewed all health maintenance requirements and ordered appropriate tests  Health Maintenance Due   Topic Date Due    Varicella vaccine (1 of 2 - 13+ 2-dose series) Never done    HIV screen  Never done    Hepatitis C screen  Never done    Hepatitis B vaccine (1 of 3 - 19+ 3-dose series) Never done    DTaP/Tdap/Td vaccine (1 - Tdap) Never done    Flu vaccine (1) Never done    COVID-19 Vaccine (3 - 2023- season) 2024       Past Surgical History:     No past surgical history on file.     Medications:       Prior to Admission medications    Medication Sig Start Date End Date Taking? Authorizing Provider   famotidine (PEPCID) 20 MG tablet Take 1 tablet by mouth nightly 10/3/24  Yes Jalen Cox DNP   omeprazole (PRILOSEC) 40 MG delayed release capsule Take 1 capsule by mouth 2 times daily  Patient taking differently: Take 1 capsule by mouth daily 24  Yes Jalen Cox DNP   traZODone (DESYREL) 100 MG tablet Take 1 tablet by mouth nightly 24  Yes Jalen Cox DNP        Allergies:       Penicillins    Social History:     Tobacco:    reports that he quit smoking about 6 years ago. His smoking use included

## 2024-12-19 NOTE — TELEPHONE ENCOUNTER
Last OV: 10/31/2024  reflux 07/17/24 reflux and insonia   Last RX:    Next scheduled apt: 5/1/2025  3 months ADHD            Pt requesting a refill   The trazodine was filled on 07/11/24 with three refills for 90 days supply

## 2024-12-19 NOTE — TELEPHONE ENCOUNTER
Pepcid 20 mg  Omeprazole 40 mg  Trazodone 100 mg    Mail order to [facility]    Last check up 10/31/24  Appt 5/1/24    Insurance is wanting him to do mail order now.      Health Maintenance   Topic Date Due    Varicella vaccine (1 of 2 - 13+ 2-dose series) Never done    HIV screen  Never done    Hepatitis C screen  Never done    Hepatitis B vaccine (1 of 3 - 19+ 3-dose series) Never done    DTaP/Tdap/Td vaccine (1 - Tdap) Never done    Flu vaccine (1) Never done    COVID-19 Vaccine (3 - 2023-24 season) 09/01/2024    Depression Screen  07/09/2025    Lipids  07/11/2029    Hepatitis A vaccine  Aged Out    Hib vaccine  Aged Out    HPV vaccine  Aged Out    Polio vaccine  Aged Out    Meningococcal (ACWY) vaccine  Aged Out    Pneumococcal 0-64 years Vaccine  Aged Out             (applicable per patient's age: Cancer Screenings, Depression Screening, Fall Risk Screening, Immunizations)    AST (U/L)   Date Value   07/11/2024 21     ALT (U/L)   Date Value   07/11/2024 21     BUN (mg/dL)   Date Value   07/11/2024 9      (goal A1C is < 7)   (goal LDL is <100) need 30-50% reduction from baseline     BP Readings from Last 3 Encounters:   10/31/24 112/74   10/03/24 118/74   07/11/24 106/70    (goal /80)      All Future Testing planned in [facility]:      Next Visit Date:  Future Appointments   Date Time Provider Department Center   5/1/2025  2:40 PM Jalen Cox DNP [facility] DE[facility]            There is no problem list on file for this patient.

## 2024-12-20 RX ORDER — OMEPRAZOLE 40 MG/1
40 CAPSULE, DELAYED RELEASE ORAL 2 TIMES DAILY
Qty: 180 CAPSULE | Refills: 1 | Status: SHIPPED | OUTPATIENT
Start: 2024-12-20

## 2024-12-20 RX ORDER — FAMOTIDINE 20 MG/1
20 TABLET, FILM COATED ORAL NIGHTLY
Qty: 90 TABLET | Refills: 1 | Status: SHIPPED | OUTPATIENT
Start: 2024-12-20

## 2024-12-30 ENCOUNTER — TELEPHONE (OUTPATIENT)
Dept: FAMILY MEDICINE CLINIC | Age: 44
End: 2024-12-30

## 2024-12-30 NOTE — TELEPHONE ENCOUNTER
[facility] called in stating that insurance will not cover the omeprazole twice daily without a prior authorization.

## 2025-01-08 RX ORDER — OMEPRAZOLE 40 MG/1
CAPSULE, DELAYED RELEASE ORAL
Refills: 0 | OUTPATIENT
Start: 2025-01-08

## 2025-01-09 RX ORDER — OMEPRAZOLE 40 MG/1
40 CAPSULE, DELAYED RELEASE ORAL 2 TIMES DAILY
Qty: 180 CAPSULE | Refills: 1 | Status: SHIPPED | OUTPATIENT
Start: 2025-01-09

## 2025-01-16 DIAGNOSIS — F51.01 PRIMARY INSOMNIA: ICD-10-CM

## 2025-01-16 RX ORDER — TRAZODONE HYDROCHLORIDE 100 MG/1
100 TABLET ORAL NIGHTLY
Qty: 90 TABLET | Refills: 1 | Status: SHIPPED | OUTPATIENT
Start: 2025-01-16

## 2025-05-02 ENCOUNTER — OFFICE VISIT (OUTPATIENT)
Dept: FAMILY MEDICINE CLINIC | Age: 45
End: 2025-05-02
Payer: COMMERCIAL

## 2025-05-02 VITALS
WEIGHT: 183 LBS | DIASTOLIC BLOOD PRESSURE: 78 MMHG | BODY MASS INDEX: 24.25 KG/M2 | HEART RATE: 56 BPM | OXYGEN SATURATION: 97 % | HEIGHT: 73 IN | SYSTOLIC BLOOD PRESSURE: 118 MMHG

## 2025-05-02 DIAGNOSIS — K21.9 GASTROESOPHAGEAL REFLUX DISEASE WITHOUT ESOPHAGITIS: Primary | ICD-10-CM

## 2025-05-02 DIAGNOSIS — F51.01 PRIMARY INSOMNIA: ICD-10-CM

## 2025-05-02 PROCEDURE — 99214 OFFICE O/P EST MOD 30 MIN: CPT | Performed by: NURSE PRACTITIONER

## 2025-05-02 SDOH — ECONOMIC STABILITY: FOOD INSECURITY: WITHIN THE PAST 12 MONTHS, YOU WORRIED THAT YOUR FOOD WOULD RUN OUT BEFORE YOU GOT MONEY TO BUY MORE.: NEVER TRUE

## 2025-05-02 SDOH — ECONOMIC STABILITY: FOOD INSECURITY: WITHIN THE PAST 12 MONTHS, THE FOOD YOU BOUGHT JUST DIDN'T LAST AND YOU DIDN'T HAVE MONEY TO GET MORE.: NEVER TRUE

## 2025-05-02 ASSESSMENT — ENCOUNTER SYMPTOMS
COUGH: 0
VOMITING: 0
SORE THROAT: 0
GLOBUS SENSATION: 0
SHORTNESS OF BREATH: 0
HEARTBURN: 0
DIARRHEA: 0
NAUSEA: 0

## 2025-05-02 ASSESSMENT — PATIENT HEALTH QUESTIONNAIRE - PHQ9
2. FEELING DOWN, DEPRESSED OR HOPELESS: MORE THAN HALF THE DAYS
SUM OF ALL RESPONSES TO PHQ QUESTIONS 1-9: 2
SUM OF ALL RESPONSES TO PHQ QUESTIONS 1-9: 2
1. LITTLE INTEREST OR PLEASURE IN DOING THINGS: NOT AT ALL
SUM OF ALL RESPONSES TO PHQ QUESTIONS 1-9: 2
SUM OF ALL RESPONSES TO PHQ QUESTIONS 1-9: 2

## 2025-05-02 NOTE — PROGRESS NOTES
HPI Notes    Name: Pacheco Lloyd  : 1980         Chief Complaint:     Chief Complaint   Patient presents with    Gastroesophageal Reflux     Taking famotide and omeprazole     Insomnia     Pt is taking trazodone.        History of Present Illness:        Gastroesophageal Reflux  He reports no chest pain, no coughing, no globus sensation, no heartburn, no nausea or no sore throat. This is a chronic problem. The current episode started more than 1 year ago. The problem occurs rarely. The problem has been gradually improving. Nothing aggravates the symptoms. Risk factors include caffeine use. He has tried a PPI and a histamine-2 antagonist for the symptoms. The treatment provided moderate relief.   Insomnia  This is a chronic problem. The current episode started more than 1 year ago. The problem occurs rarely. Pertinent negatives include no chest pain, chills, coughing, fever, headaches, nausea, sore throat or vomiting. Nothing aggravates the symptoms. Treatments tried: trazodone. The treatment provided moderate relief.       Past Medical History:     No past medical history on file.   Reviewed all health maintenance requirements and ordered appropriate tests  Health Maintenance Due   Topic Date Due    Varicella vaccine (1 of 2 - 13+ 2-dose series) Never done    HIV screen  Never done    Hepatitis C screen  Never done    Hepatitis B vaccine (1 of 3 - 19+ 3-dose series) Never done    DTaP/Tdap/Td vaccine (1 - Tdap) Never done    COVID-19 Vaccine (3 - 2024- season) 2024       Past Surgical History:     No past surgical history on file.     Medications:       Prior to Admission medications    Medication Sig Start Date End Date Taking? Authorizing Provider   traZODone (DESYREL) 100 MG tablet Take 1 tablet by mouth nightly 25  Yes Jalen Cox DNP   omeprazole (PRILOSEC) 40 MG delayed release capsule Take 1 capsule by mouth 2 times daily  Patient taking differently: Take 1 capsule by mouth daily

## 2025-05-02 NOTE — PATIENT INSTRUCTIONS
SURVEY:    You may be receiving a survey from [facility] regarding your visit today.    You may get this in the mail, through your MyChart or in your email.     Please complete the survey to enable us to provide the highest quality of care to you and your family.      Thank you.    Clinical Care Team:         AIRAM Alexandra-ANTOINETTE Hendrickson                         Triage:         ANTOINETTE Duque             Clerical Team:        Cathy Finney       [place] [phone]           Billing questions: [phone]           Medical Records Request: [phone]

## 2025-06-16 DIAGNOSIS — K21.9 GASTROESOPHAGEAL REFLUX DISEASE WITHOUT ESOPHAGITIS: Primary | ICD-10-CM

## 2025-06-16 RX ORDER — OMEPRAZOLE 40 MG/1
40 CAPSULE, DELAYED RELEASE ORAL 2 TIMES DAILY
Qty: 180 CAPSULE | Refills: 3 | Status: SHIPPED | OUTPATIENT
Start: 2025-06-16

## 2025-06-16 RX ORDER — FAMOTIDINE 20 MG/1
20 TABLET, FILM COATED ORAL NIGHTLY
Qty: 90 TABLET | Refills: 1 | Status: SHIPPED | OUTPATIENT
Start: 2025-06-16

## 2025-06-16 NOTE — TELEPHONE ENCOUNTER
Last visit:  5/2/2025  Next Visit Date:    Future Appointments   Date Time Provider Department Center   5/4/2026  2:40 PM Jalen Cox DNP [facility] DEP         Medication List:  Prior to Admission medications    Medication Sig Start Date End Date Taking? Authorizing Provider   traZODone (DESYREL) 100 MG tablet Take 1 tablet by mouth nightly 1/16/25   Jalen Cox DNP   omeprazole (PRILOSEC) 40 MG delayed release capsule Take 1 capsule by mouth 2 times daily  Patient taking differently: Take 1 capsule by mouth daily 1/9/25   Jalen Cox DNP   famotidine (PEPCID) 20 MG tablet Take 1 tablet by mouth nightly 12/20/24   Jalen Cox DNP            Pharmacy is requesting refill of omeprazole 40mg. Medication is pending approval.

## 2025-06-16 NOTE — TELEPHONE ENCOUNTER
Last OV: 5/2/2025  reflux   Last RX:    Next scheduled apt: 05/04/26 one year            Surescript requesting a refill   Medication pending for approval

## 2025-07-04 DIAGNOSIS — F51.01 PRIMARY INSOMNIA: ICD-10-CM

## 2025-07-07 RX ORDER — TRAZODONE HYDROCHLORIDE 100 MG/1
100 TABLET ORAL NIGHTLY
Qty: 90 TABLET | Refills: 3 | Status: SHIPPED | OUTPATIENT
Start: 2025-07-07

## 2025-07-31 ENCOUNTER — OFFICE VISIT (OUTPATIENT)
Dept: FAMILY MEDICINE CLINIC | Age: 45
End: 2025-07-31
Payer: COMMERCIAL

## 2025-07-31 ENCOUNTER — TELEPHONE (OUTPATIENT)
Dept: FAMILY MEDICINE CLINIC | Age: 45
End: 2025-07-31

## 2025-07-31 VITALS
WEIGHT: 179 LBS | SYSTOLIC BLOOD PRESSURE: 110 MMHG | BODY MASS INDEX: 23.62 KG/M2 | HEART RATE: 60 BPM | DIASTOLIC BLOOD PRESSURE: 60 MMHG | OXYGEN SATURATION: 98 %

## 2025-07-31 DIAGNOSIS — H57.11 ACUTE RIGHT EYE PAIN: Primary | ICD-10-CM

## 2025-07-31 DIAGNOSIS — H02.841 SWELLING OF RIGHT UPPER EYELID: ICD-10-CM

## 2025-07-31 DIAGNOSIS — H01.001 BLEPHARITIS OF RIGHT UPPER EYELID, UNSPECIFIED TYPE: ICD-10-CM

## 2025-07-31 PROCEDURE — 99213 OFFICE O/P EST LOW 20 MIN: CPT | Performed by: STUDENT IN AN ORGANIZED HEALTH CARE EDUCATION/TRAINING PROGRAM

## 2025-07-31 RX ORDER — ERYTHROMYCIN 5 MG/G
OINTMENT OPHTHALMIC
Qty: 3.5 G | Refills: 0 | Status: SHIPPED | OUTPATIENT
Start: 2025-07-31 | End: 2025-08-10

## 2025-07-31 ASSESSMENT — ENCOUNTER SYMPTOMS
WHEEZING: 0
SORE THROAT: 0
EYE PAIN: 1
EYE ITCHING: 1
COUGH: 0
BACK PAIN: 0
ABDOMINAL PAIN: 0
SINUS PAIN: 0
EYE DISCHARGE: 1
VOMITING: 0
NAUSEA: 0
DIARRHEA: 0

## 2025-07-31 NOTE — PATIENT INSTRUCTIONS
SURVEY:    You may be receiving a survey from [facility] regarding your visit today.    You may get this in the mail, through your MyChart or in your email.     Please complete the survey to enable us to provide the highest quality of care to you and your family.    If you cannot score us as very good ( 5 Stars) on any question, please feel free to call the office to discuss how we could have made your experience exceptional.     Thank you.    Clinical Care Team:  Dr. Jigar West, DO Fanta Prakash LPN    Triage:  Cathy Nam CMA    Clerical Team:  Cathy Finney

## 2025-07-31 NOTE — PROGRESS NOTES
HPI Notes    Name: Pacheco Lloyd  : 1980         Chief Complaint:     Chief Complaint   Patient presents with    Facial Swelling     Patient has had reoccurring blepharitis since 2018. Currently experiencing blurry vision, swelling of the eye lid, headaches and eye pressure.        History of Present Illness:      HPI    This is a 44-year-old man presenting for facial swelling.  He reports he has had recurrent problems with blepharitis since 2018 and is currently experiencing blurry vision, swelling of the eyelids, headaches and eye pressure.  He meticulously washes his eyes, eyebrows and eyelids with Ocusert often every day although this morning he did notice some flakes on his eyelashes.    Past Medical History:     No past medical history on file.   Reviewed all health maintenance requirements and ordered appropriate tests  Health Maintenance Due   Topic Date Due    Varicella vaccine (1 of 2 - 13+ 2-dose series) Never done    HIV screen  Never done    Hepatitis C screen  Never done    Hepatitis B vaccine (1 of 3 - 19+ 3-dose series) Never done    DTaP/Tdap/Td vaccine (1 - Tdap) Never done    COVID-19 Vaccine (3 - 2024- season) 2024       Past Surgical History:     No past surgical history on file.     Medications:       Prior to Admission medications    Medication Sig Start Date End Date Taking? Authorizing Provider   erythromycin (ROMYCIN) 5 MG/GM ophthalmic ointment Instill a 1 cm ribbon into the right eye before bed 7/31/25 8/10/25 Yes Jigar West DO   traZODone (DESYREL) 100 MG tablet TAKE 1 TABLET NIGHTLY 25  Yes Jigar West DO   famotidine (PEPCID) 20 MG tablet TAKE 1 TABLET NIGHTLY 25  Yes Jalen Cox DNP   omeprazole (PRILOSEC) 40 MG delayed release capsule TAKE 1 CAPSULE TWICE A DAY 25  Yes Jalen Cox DNP        Allergies:       Penicillins    Social History:     Tobacco:    reports that he quit smoking about 7 years ago. His smoking use